# Patient Record
Sex: FEMALE | Race: WHITE | NOT HISPANIC OR LATINO | Employment: STUDENT | URBAN - METROPOLITAN AREA
[De-identification: names, ages, dates, MRNs, and addresses within clinical notes are randomized per-mention and may not be internally consistent; named-entity substitution may affect disease eponyms.]

---
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
## 2020-01-27 ENCOUNTER — TRANSFERRED RECORDS (OUTPATIENT)
Dept: HEALTH INFORMATION MANAGEMENT | Facility: CLINIC | Age: 20
End: 2020-01-27

## 2020-02-27 ENCOUNTER — TRANSFERRED RECORDS (OUTPATIENT)
Dept: HEALTH INFORMATION MANAGEMENT | Facility: CLINIC | Age: 20
End: 2020-02-27

## 2020-05-05 ENCOUNTER — TRANSFERRED RECORDS (OUTPATIENT)
Dept: HEALTH INFORMATION MANAGEMENT | Facility: CLINIC | Age: 20
End: 2020-05-05

## 2020-08-13 ENCOUNTER — TELEPHONE (OUTPATIENT)
Dept: PEDIATRICS | Facility: CLINIC | Age: 20
End: 2020-08-13

## 2020-08-13 NOTE — TELEPHONE ENCOUNTER
I spoke to patient. I told patient current recommendations are to not start having pap smears until age 21 and Dr. Montoya is able to do this when she is that age.    Patient scheduled physical.    SANJUANA ABBASI MA on 8/13/2020 at 2:24 PM

## 2020-08-13 NOTE — TELEPHONE ENCOUNTER
Patient Returning Call  Reason for call:  Medical advise (mother called)  Information relayed to patient:  Nurse will call back  Patient has additional questions:  Yes  What are your questions/concerns:  Patient's mother wants to know whether her daughter should see Dr. Montoya for her physical or go to see an OBGYN for her physical. Mother said she has consent to communicate.  Okay to leave a detailed message?: Yes at Home number on file [phone] (home)

## 2020-12-02 NOTE — PROGRESS NOTES
"Pre-Visit Planning   Next 5 appointments (look out 90 days)    Dec 07, 2020  9:30 AM  (Arrive by 9:05 AM)  Adult Preventative Visit with ZURDO Bishop CNP  [facility] [address] [place] [address]  [address] [place] [id number] [phone]        Appointment Notes for this encounter:   PLEASE COPY INS CARD  physical (rescheduled from 12/4 & 12/9, Jan)    Questionnaires Reviewed/Assigned  No additional questionnaires are needed        Patient preferred phone number: [phone]      Spoke to patient via phone. Patient does not have additional questions or concerns.        Visit is preventive. Reviewed purpose of preventive visit with patient.    Health Maintenance Due   Topic Date Due     ANNUAL REVIEW OF HM ORDERS  2000     HIV SCREENING  09/27/2015     HEPATITIS C SCREENING  09/27/2018     PHQ-2  01/01/2020     PREVENTIVE CARE VISIT  08/16/2020     INFLUENZA VACCINE (1) 09/01/2020     Patient is due for:  N/A    [employer]  Patient is not active on [employer]. Encouraged [employer] activation.    Questionnaire Review   Advised patient to arrive early in order to complete questionnaires.    Call Summary  \"Thank you for your time today.  If anything comes up before your appointment, please feel free to contact us at [phone].\"    "

## 2020-12-07 ENCOUNTER — OFFICE VISIT (OUTPATIENT)
Dept: PEDIATRICS | Facility: CLINIC | Age: 20
End: 2020-12-07
Payer: COMMERCIAL

## 2020-12-07 VITALS
OXYGEN SATURATION: 99 % | RESPIRATION RATE: 16 BRPM | BODY MASS INDEX: 21.24 KG/M2 | DIASTOLIC BLOOD PRESSURE: 62 MMHG | TEMPERATURE: 97.2 F | HEART RATE: 61 BPM | WEIGHT: 118 LBS | SYSTOLIC BLOOD PRESSURE: 110 MMHG

## 2020-12-07 DIAGNOSIS — Z11.3 ROUTINE SCREENING FOR STI (SEXUALLY TRANSMITTED INFECTION): ICD-10-CM

## 2020-12-07 DIAGNOSIS — F41.9 ANXIETY: ICD-10-CM

## 2020-12-07 DIAGNOSIS — Z30.431 CHECKING OF INTRAUTERINE DEVICE: ICD-10-CM

## 2020-12-07 DIAGNOSIS — Z00.00 ROUTINE GENERAL MEDICAL EXAMINATION AT A HEALTH CARE FACILITY: Primary | ICD-10-CM

## 2020-12-07 DIAGNOSIS — R17 ELEVATED BILIRUBIN: ICD-10-CM

## 2020-12-07 PROCEDURE — 96127 BRIEF EMOTIONAL/BEHAV ASSMT: CPT | Performed by: NURSE PRACTITIONER

## 2020-12-07 PROCEDURE — 90686 IIV4 VACC NO PRSV 0.5 ML IM: CPT | Performed by: NURSE PRACTITIONER

## 2020-12-07 PROCEDURE — 90471 IMMUNIZATION ADMIN: CPT | Performed by: NURSE PRACTITIONER

## 2020-12-07 PROCEDURE — 99395 PREV VISIT EST AGE 18-39: CPT | Mod: 25 | Performed by: NURSE PRACTITIONER

## 2020-12-07 RX ORDER — CLINDAMYCIN PHOSPHATE 10 MG/G
GEL TOPICAL DAILY
COMMUNITY

## 2020-12-07 RX ORDER — ESCITALOPRAM OXALATE 10 MG/1
20 TABLET ORAL DAILY
COMMUNITY
End: 2023-07-19

## 2020-12-07 ASSESSMENT — ENCOUNTER SYMPTOMS
HEMATOCHEZIA: 0
NAUSEA: 0
MYALGIAS: 0
DYSURIA: 0
JOINT SWELLING: 0
FEVER: 0
HEARTBURN: 0
ABDOMINAL PAIN: 0
NERVOUS/ANXIOUS: 1
BREAST MASS: 0
ARTHRALGIAS: 0
PALPITATIONS: 0
DIARRHEA: 0
PARESTHESIAS: 0
SHORTNESS OF BREATH: 0
FREQUENCY: 0
HEMATURIA: 0
HEADACHES: 0
SORE THROAT: 0
CONSTIPATION: 0
COUGH: 0
CHILLS: 0
DIZZINESS: 0
WEAKNESS: 0
EYE PAIN: 0

## 2020-12-07 NOTE — PROGRESS NOTES
SUBJECTIVE:   CC: Chelsea Feldman is an 20 year old woman who presents for preventive health visit.     Patient has been advised of split billing requirements and indicates understanding: Yes       Healthy Habits:     Getting at least 3 servings of Calcium per day:  Yes    Bi-annual eye exam:  Yes    Dental care twice a year:  Yes    Sleep apnea or symptoms of sleep apnea:  None    Diet:  Regular (no restrictions)    Frequency of exercise:  2-3 days/week    Duration of exercise:  30-45 minutes    Taking medications regularly:  Yes    Medication side effects:  Not applicable    PHQ-2 Total Score: 0    Additional concerns today:  Yes      IUD x 3.5 years, Mirena. Has been having breakthrough bleeding over the past couple of months. Wondering if her IUD is still effective.     Recently started anxiety medications - escitalopram and propanolol.       Today's PHQ-2 Score:   PHQ-2 ( 1999 Pfizer) 12/7/2020   Q1: Little interest or pleasure in doing things 0   Q2: Feeling down, depressed or hopeless 0   PHQ-2 Score 0   Q1: Little interest or pleasure in doing things Not at all   Q2: Feeling down, depressed or hopeless Not at all   PHQ-2 Score 0       Abuse: Current or Past (Physical, Sexual or Emotional) - No  Do you feel safe in your environment? Yes      Social History     Tobacco Use     Smoking status: Never Smoker     Smokeless tobacco: Never Used   Substance Use Topics     Alcohol use: No     Alcohol/week: 0.0 standard drinks     Frequency: 2-4 times a month     Drinks per session: 1 or 2     Binge frequency: Never       Alcohol Use 12/7/2020   Prescreen: >3 drinks/day or >7 drinks/week? No       Reviewed orders with patient.  Reviewed health maintenance and updated orders accordingly - Yes     Mammogram not appropriate for this patient based on age. Pertinent mammograms are reviewed under the imaging tab.    History of abnormal Pap smear: NO - under age 21, PAP not appropriate for age     Reviewed and updated  as needed this visit by clinical staff   Allergies               Reviewed and updated as needed this visit by Provider                Past Medical History:   Diagnosis Date     Allergic rhinitis, cause unspecified     Allergic rhinitis     Contact dermatitis and other eczema, due to unspecified cause      Past Surgical History:   Procedure Laterality Date     ARTHROSCOPIC RECONSTRUCTION ANTERIOR CRUCIATE LIGAMENT Right 10/7/2016    Procedure: ARTHROSCOPIC RECONSTRUCTION ANTERIOR CRUCIATE LIGAMENT;  Surgeon: Dheeraj Zepeda MD;  Location: [facility] OR     Family History   Problem Relation Age of Onset     No Known Problems Mother      Cancer - colorectal Father 50        in remission     Asthma Maternal Aunt         possibly weight induced     Hypertension Maternal Aunt         weight induced     Diabetes Maternal Aunt         type 2-possibly weight induced     C.A.D. No family hx of      Cerebrovascular Disease No family hx of      Breast Cancer No family hx of      Social History     Socioeconomic History     Marital status: Single     Spouse name: Not on file     Number of children: Not on file     Years of education: Not on file     Highest education level: 12th grade   Occupational History     Not on file   Social Needs     Financial resource strain: Not hard at all     Food insecurity     Worry: Never true     Inability: Never true     Transportation needs     Medical: No     Non-medical: No   Tobacco Use     Smoking status: Never Smoker     Smokeless tobacco: Never Used   Substance and Sexual Activity     Alcohol use: No     Alcohol/week: 0.0 standard drinks     Frequency: 2-4 times a month     Drinks per session: 1 or 2     Binge frequency: Never     Drug use: No     Sexual activity: Yes     Partners: Male     Birth control/protection: I.U.D.   Lifestyle     Physical activity     Days per week: 3 days     Minutes per session: 40 min     Stress: Only a little   Relationships     Social connections     Talks  on phone: More than three times a week     Gets together: More than three times a week     Attends [employer] service: 1 to 4 times per year     Active member of club or organization: No     Attends meetings of clubs or organizations: Never     Relationship status: Never      Intimate partner violence     Fear of current or ex partner: Not on file     Emotionally abused: Not on file     Physically abused: Not on file     Forced sexual activity: Not on file   Other Topics Concern     Not on file   Social History Narrative    Full time student at [employer], Sophomore, studying business. Also works part time at [employer].             Eliz Love, DNP, APRN, CNP     Current Outpatient Medications   Medication     clindamycin (CLINDAMAX) 1 % external gel     escitalopram (LEXAPRO) 10 MG tablet     PROPRANOLOL HCL PO     levonorgestrel (MIRENA) 20 MCG/24HR IUD     No current facility-administered medications for this visit.         Allergies   Allergen Reactions     No Known Drug Allergies        Review of Systems   Constitutional: Negative for chills and fever.   HENT: Negative for congestion, ear pain, hearing loss and sore throat.    Eyes: Negative for pain and visual disturbance.   Respiratory: Negative for cough and shortness of breath.    Cardiovascular: Negative for chest pain, palpitations and peripheral edema.   Gastrointestinal: Negative for abdominal pain, constipation, diarrhea, heartburn, hematochezia and nausea.   Breasts:  Negative for tenderness, breast mass and discharge.   Genitourinary: Negative for dysuria, frequency, genital sores, hematuria, pelvic pain, urgency, vaginal bleeding and vaginal discharge.   Musculoskeletal: Negative for arthralgias, joint swelling and myalgias.   Skin: Negative for rash.   Neurological: Negative for dizziness, weakness, headaches and paresthesias.   Psychiatric/Behavioral: Negative for mood changes. The patient is nervous/anxious.        OBJECTIVE:   BP  110/62   Pulse 61   Temp 97.2  F (36.2  C) (Tympanic)   Resp 16   Wt 53.5 kg (118 lb)   SpO2 99%   BMI 21.24 kg/m    Physical Exam  Constitutional: appears to be in no acute distress, comfortable, pleasant.   Eyes: anicteric, conjunctiva clear without drainage or erythema. ANGELO.   Ears, Nose and Throat: tympanic membranes gray with LR,  nose without nasal discharge. OP: no erythema to posterior pharynx, negative post nasal drainage, tonsils +1 no erythema or exudate.  Neck: supple, thyroid palpable,not enlarged, no nodules   Breast: Deferred exam.  Cardiovascular: regular rate and rhythm, normal S1 and S2, no murmurs, rubs or gallops, peripheral pulses full and symmetric; negative peripheral edema   Respiratory: Air entry throughout. Breathing pattern unlabored without the use of accessory muscles. Clear to auscultation A and P, no wheezes or crackles, normal breath sounds.    Gastrointestinal: rounded, soft. Positive bowel sounds x4, nontender, no masses.   Genitourinary: external genitalia is without lesions. Introitus is normal, vaginal walls pink and moist without lesions or evidence of trauma. There is no abnormal discharge from the cervix. IUD strings visualized. Cervix is pink without polyps or lesions.  Musculoskeletal: full range of motion, no edema.   Skin: pink, turgor smooth and elastic. Negative for lesions or dryness.  Neurological: normal gait, no tremor.   Psychological: appropriate mood and affect.   Lymphatic: no cervical, axillary, supraclavicular, or infraclavicular lymphadenopathy.    Diagnostic Test Results:  Labs reviewed in Epic    ASSESSMENT/PLAN:   1. Routine general medical examination at a health care facility  Age appropriate screening and preventative care have been addressed today. Vaccinations have been updated. Recommend annual vision exams as well as biannual dental exams. They will follow up for annual physical again in one year.   Plan:   - INFLUENZA VACCINE IM > 6 MONTHS  "VALENT IIV4 [54578]   - INITIAL VACCINE ADMINSTRATION   - Lipid panel reflex to direct LDL Fasting; Future   - Glucose; Future    2. Routine screening for STI (sexually transmitted infection)  Denies current s/sx of STI.   Plan:   - Treponema Abs w Reflex to RPR and Titer; Future   - **Hepatitis C Screen Reflex to RNA FUTURE anytime; Future   - HIV Antigen Antibody Combo; Future   - Chlamydia trachomatis PCR; Future   - Neisseria gonorrhoeae PCR; Future    3. Anxiety  Continue escitalopram and propanolol as needed.     4. Elevated bilirubin  History of elevated bilirubin. Recheck hepatic panel today.  Plan:   - Hepatic panel (Albumin, ALT, AST, Bili, Alk Phos, TP); Future    5. Checking of intrauterine device  IUD x 3.5 years, Mirena. Has been having breakthrough bleeding over the past couple of months. IUD strings visualized on exam today, reassurance offered.        Follow-up: Lab results pending, will follow-up as indicated after reviewing results.       COUNSELING:  Reviewed preventive health counseling, as reflected in patient instructions  Special attention given to:        Regular exercise       Healthy diet/nutrition       Immunizations    Vaccinated for: Influenza         Contraception       Safe sex practices/STD prevention       HIV screeninx in teen years, 1x in adult years, and at intervals if high risk    Estimated body mass index is 21.24 kg/m  as calculated from the following:    Height as of 19: 1.588 m (5' 2.5\").    Weight as of this encounter: 53.5 kg (118 lb).      She reports that she has never smoked. She has never used smokeless tobacco.      Counseling Resources:  ATP IV Guidelines  Pooled Cohorts Equation Calculator  Breast Cancer Risk Calculator  BRCA-Related Cancer Risk Assessment: FHS-7 Tool  FRAX Risk Assessment  ICSI Preventive Guidelines  Dietary Guidelines for Americans, 2010  USDA's MyPlate  ASA Prophylaxis  Lung CA Screening    Eliz Love, ZURDO CNP  [facility] " [facility]

## 2020-12-23 DIAGNOSIS — Z00.00 ROUTINE GENERAL MEDICAL EXAMINATION AT A HEALTH CARE FACILITY: ICD-10-CM

## 2020-12-23 DIAGNOSIS — Z11.3 ROUTINE SCREENING FOR STI (SEXUALLY TRANSMITTED INFECTION): ICD-10-CM

## 2020-12-23 DIAGNOSIS — R17 ELEVATED BILIRUBIN: ICD-10-CM

## 2020-12-23 LAB
HCV AB SERPL QL IA: NONREACTIVE
HIV 1+2 AB+HIV1 P24 AG SERPL QL IA: NONREACTIVE
T PALLIDUM AB SER QL: NONREACTIVE

## 2020-12-23 PROCEDURE — 87389 HIV-1 AG W/HIV-1&-2 AB AG IA: CPT | Performed by: NURSE PRACTITIONER

## 2020-12-23 PROCEDURE — 99000 SPECIMEN HANDLING OFFICE-LAB: CPT | Performed by: NURSE PRACTITIONER

## 2020-12-23 PROCEDURE — 86803 HEPATITIS C AB TEST: CPT | Performed by: NURSE PRACTITIONER

## 2020-12-23 PROCEDURE — 82947 ASSAY GLUCOSE BLOOD QUANT: CPT | Performed by: NURSE PRACTITIONER

## 2020-12-23 PROCEDURE — 86780 TREPONEMA PALLIDUM: CPT | Mod: 90 | Performed by: NURSE PRACTITIONER

## 2020-12-23 PROCEDURE — 80076 HEPATIC FUNCTION PANEL: CPT | Performed by: NURSE PRACTITIONER

## 2020-12-23 PROCEDURE — 36415 COLL VENOUS BLD VENIPUNCTURE: CPT | Performed by: NURSE PRACTITIONER

## 2020-12-23 PROCEDURE — 80061 LIPID PANEL: CPT | Performed by: NURSE PRACTITIONER

## 2020-12-24 LAB
ALBUMIN SERPL-MCNC: 3.8 G/DL (ref 3.4–5)
ALP SERPL-CCNC: 52 U/L (ref 40–150)
ALT SERPL W P-5'-P-CCNC: 19 U/L (ref 0–50)
AST SERPL W P-5'-P-CCNC: 13 U/L (ref 0–45)
BILIRUB DIRECT SERPL-MCNC: 0.3 MG/DL (ref 0–0.2)
BILIRUB SERPL-MCNC: 1 MG/DL (ref 0.2–1.3)
CHOLEST SERPL-MCNC: 109 MG/DL
GLUCOSE SERPL-MCNC: 80 MG/DL (ref 70–99)
HDLC SERPL-MCNC: 59 MG/DL
LDLC SERPL CALC-MCNC: 40 MG/DL
NONHDLC SERPL-MCNC: 50 MG/DL
PROT SERPL-MCNC: 6.8 G/DL (ref 6.8–8.8)
TRIGL SERPL-MCNC: 51 MG/DL

## 2021-02-15 ENCOUNTER — OFFICE VISIT (OUTPATIENT)
Dept: OBGYN | Facility: CLINIC | Age: 21
End: 2021-02-15
Payer: COMMERCIAL

## 2021-02-15 VITALS — DIASTOLIC BLOOD PRESSURE: 60 MMHG | SYSTOLIC BLOOD PRESSURE: 110 MMHG | WEIGHT: 119.9 LBS | BODY MASS INDEX: 21.58 KG/M2

## 2021-02-15 DIAGNOSIS — Z30.430 ENCOUNTER FOR IUD INSERTION: ICD-10-CM

## 2021-02-15 DIAGNOSIS — Z30.432 ENCOUNTER FOR IUD REMOVAL: Primary | ICD-10-CM

## 2021-02-15 PROCEDURE — 58300 INSERT INTRAUTERINE DEVICE: CPT | Performed by: OBSTETRICS & GYNECOLOGY

## 2021-02-15 PROCEDURE — 58301 REMOVE INTRAUTERINE DEVICE: CPT | Performed by: OBSTETRICS & GYNECOLOGY

## 2021-02-15 ASSESSMENT — ANXIETY QUESTIONNAIRES
6. BECOMING EASILY ANNOYED OR IRRITABLE: NOT AT ALL
2. NOT BEING ABLE TO STOP OR CONTROL WORRYING: MORE THAN HALF THE DAYS
7. FEELING AFRAID AS IF SOMETHING AWFUL MIGHT HAPPEN: NOT AT ALL
1. FEELING NERVOUS, ANXIOUS, OR ON EDGE: MORE THAN HALF THE DAYS
GAD7 TOTAL SCORE: 7
5. BEING SO RESTLESS THAT IT IS HARD TO SIT STILL: SEVERAL DAYS
3. WORRYING TOO MUCH ABOUT DIFFERENT THINGS: SEVERAL DAYS

## 2021-02-15 ASSESSMENT — PATIENT HEALTH QUESTIONNAIRE - PHQ9: 5. POOR APPETITE OR OVEREATING: SEVERAL DAYS

## 2021-02-15 NOTE — NURSING NOTE
"Chief Complaint   Patient presents with     Consult     for IUD replacement. Patient had Mirena IUD inserted 2018. Recently in the past 4 months she started getting cramps, mood swings, pre-period discharge, and getting her period again. Bleeding can last anywhere from 3 days to a week. Patient is having  itching and odor before her periods.        Initial /60   Wt 54.4 kg (119 lb 14.4 oz)   LMP 2021   BMI 21.58 kg/m   Estimated body mass index is 21.58 kg/m  as calculated from the following:    Height as of 19: 1.588 m (5' 2.5\").    Weight as of this encounter: 54.4 kg (119 lb 14.4 oz).  BP completed using cuff size: regular    Questioned patient about current smoking habits.  Pt. has never smoked.          The following HM Due: NONE      Johanne Hager CMA             "

## 2021-02-15 NOTE — PROGRESS NOTES
INDICATIONS:                                                      Chelsea Feldman is a 20 year old female,, Patient's last menstrual period was 2021. She presents today for mirena  IUD removal and reinsertion. Her current IUD was placed 3 years ago for management of dysmenorrhea and HUB. She had good success for 3 years, but has now had return of cramping, PMS symptoms, menses, and discharge prior to menses in the last 5 months. She would like to try replacing the IUD. She has not had problems with the IUD. We discussed option for adding an combined oral contraceptives or progesterone to maximize effects of IUD vs replacing the IUD today, which she desires. The risks, benefits and alternatives of IUD insertion were discussed in detail previously.  She has elected to go ahead with the insertion  today and her questions were answered. Consent was signed. A pregnancy test was not performed today due to continued use of effective contraception.    Past Medical History:   Diagnosis Date     Allergic rhinitis, cause unspecified     Allergic rhinitis     Anxiety      Contact dermatitis and other eczema, due to unspecified cause       Past Surgical History:   Procedure Laterality Date     ARTHROSCOPIC RECONSTRUCTION ANTERIOR CRUCIATE LIGAMENT Right 10/7/2016    Procedure: ARTHROSCOPIC RECONSTRUCTION ANTERIOR CRUCIATE LIGAMENT;  Surgeon: Dheeraj Zepeda MD;  Location: [facility] OR        ROS:  Const: no weight change, fatigue  : + dysmenorrhea, menorrhagia, no intermenstrual bleeding    PROCEDURE:                                                    /60   Wt 54.4 kg (119 lb 14.4 oz)   LMP 2021   BMI 21.58 kg/m     The pelvic exam revealed normal external genitalia. On bimanual exam the uterus was Anteverted and normal in size with no tenderness present. A speculum was inserted into the vagina and the cervix was visualized. The strings were clearly visible at the external cervical os and  they were grasped with a ring forceps and the IUD was removed intact. The cervix was then prepped with Betadine. The anterior lip of the cervix was grasped with a single toothed tenaculum. The uterus sounded to 7 cm. A Mirena IUD was then inserted without difficulty. The string was cut to 3 cm.  The patient experienced a moderate amount of cramping.    POST PROCEDURE:                                                    Chelsea Feldman is a 20 year old female here for removal and insertion of IUD.  She  tolerated the procedure well. There were no complications. Patient was discharged in stable condition.    Return to clinic in 4-5 weeks for IUD check.  No need for back up contraception given continuous IUD use. Call if severe cramping, fever, abnormal bleeding, abnormal discharge or pelvic pain develop.  Patient was counseled about the chance of irregular bleeding.    Puja Castaneda MD

## 2021-02-16 ASSESSMENT — ANXIETY QUESTIONNAIRES: GAD7 TOTAL SCORE: 7

## 2021-11-18 ENCOUNTER — TRANSFERRED RECORDS (OUTPATIENT)
Dept: HEALTH INFORMATION MANAGEMENT | Facility: CLINIC | Age: 21
End: 2021-11-18
Payer: COMMERCIAL

## 2021-11-23 NOTE — PATIENT INSTRUCTIONS
Preventive Health Recommendations  Female Ages 18 to 20     Yearly exam:     See your health care provider every year in order to  o Review health changes.   o Discuss preventive care.    o Review your medicines if your doctor has prescribed any.      You should be tested each year for STDs (sexually transmitted diseases).       After age 20, talk to your provider about how often you should have cholesterol testing.      If you are at risk for diabetes, you should have a diabetes test (fasting glucose).     Shots:     Get a flu shot each year.     Get a tetanus shot every 10 years.     Consider getting the shot (vaccine) that prevents cervical cancer (Gardasil).    Nutrition:     Eat at least 5 servings of fruits and vegetables each day.    Eat whole-grain bread, whole-wheat pasta and brown rice instead of white grains and rice.    Get adequate Calcium and Vitamin D.     Lifestyle    Exercise at least 150 minutes a week each week (30 minutes a day, 5 days a week). This will help you control your weight and prevent disease.    No smoking.     Wear sunscreen to prevent skin cancer.    See your dentist every six months for an exam and cleaning.   11/23/2021 11:30 AM    Ms. Sutton Davis  [address] [facility] [id number]      Mihir Chaves is under the care of Gabi Mendenhall for her left elbow. She will need an Ulnar Palsy Dynamic splint to prevent contracture.         Sincerely,              Obey Hays MD

## 2022-10-17 ENCOUNTER — TRANSFERRED RECORDS (OUTPATIENT)
Dept: HEALTH INFORMATION MANAGEMENT | Facility: CLINIC | Age: 22
End: 2022-10-17

## 2023-05-31 ENCOUNTER — TRANSFERRED RECORDS (OUTPATIENT)
Dept: HEALTH INFORMATION MANAGEMENT | Facility: CLINIC | Age: 23
End: 2023-05-31

## 2023-06-01 ENCOUNTER — TRANSFERRED RECORDS (OUTPATIENT)
Dept: MULTI SPECIALTY CLINIC | Facility: CLINIC | Age: 23
End: 2023-06-01

## 2023-06-01 LAB — PAP SMEAR - HIM PATIENT REPORTED: NEGATIVE

## 2023-06-26 NOTE — TELEPHONE ENCOUNTER
"Name: Shira Pastrana      : 1992      MRN: [id number]  Encounter Provider: Amadou Chin MD  Encounter Date: 2023   Encounter department: [place] [address] [address] [place] [address]     1  Complex regional pain syndrome type 1 of right lower extremity  Assessment & Plan:  Follow-up with pain management      2  Paramyotonia congenita  Assessment & Plan:  Follow-up with neurology      3  Intervertebral disc disorder with radiculopathy of lumbosacral region    4  Anxiety  -     ALPRAZolam (XANAX) 2 MG tablet; TAKE 2 TABLETS TWICE A DAY AND 1 TABLET AT BEDTIME         Subjective      Patient comes in for checkup  He continues to take Xanax 4 mg twice a day and 2 mg at bedtime for his chronic anxiety and insomnia associated with his complex regional pain syndrome of his right lower extremity  He has nerve stimulator which was recently adjusted to decrease his pain and symptoms are doing better along these lines  Review of Systems   Constitutional: Negative  Respiratory: Negative  Musculoskeletal: Positive for gait problem  Psychiatric/Behavioral: Positive for sleep disturbance  The patient is nervous/anxious  Current Outpatient Medications on File Prior to Visit   Medication Sig   • [DISCONTINUED] ALPRAZolam (XANAX) 2 MG tablet TAKE 2 TABLETS TWICE A DAY AND 1 TABLET AT BEDTIME   • [DISCONTINUED] predniSONE 10 mg tablet Six tablets day 1; 5 tablets day 2; 4 tablets day 3; 3 tablets day 4; 2 tablets day 5; and 1 tablet day 6 (Patient not taking: Reported on 2023)       Objective     /82 (BP Location: Left arm, Patient Position: Sitting, Cuff Size: Standard)   Pulse 68   Temp 98 2 °F (36 8 °C) (Tympanic)   Ht 6' 1\" (1 854 m)   Wt 82 7 kg (182 lb 6 4 oz)   SpO2 98%   BMI 24 06 kg/m²     Physical Exam  Constitutional:       Appearance: Normal appearance  He is well-developed  HENT:      Head: Normocephalic and atraumatic        Left " Discussed elevated bili with Dr. Monzon - sherif Olvera.  AST/ALT normal - ok to start accutane.  Derm to follow AST/ALT - I will follow up bili next summer 2019 as previously recommended.    Smiley Montoya MD  Internal Medicine/Pediatrics  [facility]       Ear: Tympanic membrane normal    Eyes:      Pupils: Pupils are equal, round, and reactive to light  Cardiovascular:      Rate and Rhythm: Normal rate and regular rhythm  Heart sounds: Normal heart sounds  Pulmonary:      Effort: Pulmonary effort is normal       Breath sounds: Normal breath sounds  Abdominal:      General: Bowel sounds are normal       Palpations: Abdomen is soft  There is no mass  Tenderness: There is no abdominal tenderness  Musculoskeletal:         General: Normal range of motion  Cervical back: Neck supple  Skin:     General: Skin is warm and dry  Neurological:      Mental Status: He is alert  Psychiatric:         Mood and Affect: Mood normal          Behavior: Behavior normal          Thought Content:  Thought content normal        Vivian Valero MD

## 2023-07-13 ENCOUNTER — NURSE TRIAGE (OUTPATIENT)
Dept: NURSING | Facility: CLINIC | Age: 23
End: 2023-07-13
Payer: COMMERCIAL

## 2023-07-14 NOTE — TELEPHONE ENCOUNTER
"    Nurse Triage SBAR    Is this a 2nd Level Triage? YES, LICENSED PRACTITIONER REVIEW IS REQUIRED    Situation: Patient is having constant abdominal pain    Background: Patient is 22 years old.  She states she started to have abdominal pain in the region of her belly button starting at 10am today.  At noon it got much worse.  Patient took some tylenol and tums and got no relief.    Assessment: Patient states she feels sweaty and weak.  She has constant abdominal pain 5/10 and sharp pains that come and go.  She doesn't feel like any position helps.    Patient has no thermometer but feels warm.  Patient had a bowel movement but no blood or black.  Patient is drinking fluids.  Some nausea no vomiting    Protocol Recommended Disposition:   No disposition on file.    Recommendation: What do you recommend? Can try pepto bismol, but should go in to be seen in ED.    Paged to provider Dr Mckoy who is backup for [facility]    Does the patient meet one of the following criteria for ADS visit consideration? 16+ years old, with an [facility] PCP         Provider Recommendation Follow Up:   Reached patient/caregiver. Informed of provider's recommendations. Patient verbalized understanding and agrees with the plan.       Reason for Disposition   [1] MILD-MODERATE pain AND [2] constant AND [3] present > 2 hours    Additional Information   Negative: Shock suspected (e.g., cold/pale/clammy skin, too weak to stand, low BP, rapid pulse)   Negative: Difficult to awaken or acting confused (e.g., disoriented, slurred speech)   Negative: Passed out (i.e., lost consciousness, collapsed and was not responding)   Negative: Sounds like a life-threatening emergency to the triager   Negative: Chest pain   Negative: Pain is mainly in upper abdomen  (if needed ask: \"is it mainly above the belly button?\")   Negative: Followed an abdomen (stomach) injury   Negative: [1] Abdominal pain AND [2] pregnant < 20 weeks   Negative: [1] Abdominal pain AND [2] " "pregnant 20 or more weeks   Negative: [1] Abdominal pain AND [2] postpartum (from 0 to 6 weeks after delivery)   Negative: [1] SEVERE pain (e.g., excruciating) AND [2] present > 1 hour   Negative: [1] SEVERE pain AND [2] age > 60 years   Negative: [1] Vomiting AND [2] contains red blood or black (\"coffee ground\") material  (Exception: few red streaks in vomit that only happened once)   Negative: Blood in bowel movements (Exception: blood on surface of BM with constipation)   Negative: Black or tarry bowel movements (Exception: chronic-unchanged black-grey bowel movements AND is taking iron pills or Pepto-bismol)   Negative: Patient sounds very sick or weak to the triager    Protocols used: Abdominal Pain - Female-A-AH    "

## 2023-07-19 ENCOUNTER — OFFICE VISIT (OUTPATIENT)
Dept: FAMILY MEDICINE | Facility: CLINIC | Age: 23
End: 2023-07-19
Payer: COMMERCIAL

## 2023-07-19 ENCOUNTER — TELEPHONE (OUTPATIENT)
Dept: GASTROENTEROLOGY | Facility: CLINIC | Age: 23
End: 2023-07-19

## 2023-07-19 VITALS
RESPIRATION RATE: 18 BRPM | TEMPERATURE: 98.5 F | HEIGHT: 63 IN | BODY MASS INDEX: 21.95 KG/M2 | WEIGHT: 123.9 LBS | OXYGEN SATURATION: 98 % | DIASTOLIC BLOOD PRESSURE: 72 MMHG | SYSTOLIC BLOOD PRESSURE: 118 MMHG | HEART RATE: 78 BPM

## 2023-07-19 DIAGNOSIS — R10.2 PELVIC PAIN IN FEMALE: ICD-10-CM

## 2023-07-19 DIAGNOSIS — R10.84 ABDOMINAL PAIN, GENERALIZED: ICD-10-CM

## 2023-07-19 DIAGNOSIS — N76.0 RECURRENT VAGINITIS: ICD-10-CM

## 2023-07-19 DIAGNOSIS — F32.9 MAJOR DEPRESSIVE DISORDER WITH CURRENT ACTIVE EPISODE, UNSPECIFIED DEPRESSION EPISODE SEVERITY, UNSPECIFIED WHETHER RECURRENT: ICD-10-CM

## 2023-07-19 DIAGNOSIS — Z00.00 ROUTINE ADULT HEALTH MAINTENANCE: Primary | ICD-10-CM

## 2023-07-19 DIAGNOSIS — N92.0 MENORRHAGIA WITH REGULAR CYCLE: ICD-10-CM

## 2023-07-19 DIAGNOSIS — F41.1 GAD (GENERALIZED ANXIETY DISORDER): ICD-10-CM

## 2023-07-19 LAB
ANION GAP SERPL CALCULATED.3IONS-SCNC: 11 MMOL/L (ref 7–15)
BUN SERPL-MCNC: 12.4 MG/DL (ref 6–20)
CALCIUM SERPL-MCNC: 9.6 MG/DL (ref 8.6–10)
CHLORIDE SERPL-SCNC: 103 MMOL/L (ref 98–107)
CLUE CELLS: ABNORMAL
CREAT SERPL-MCNC: 0.69 MG/DL (ref 0.51–0.95)
DEPRECATED HCO3 PLAS-SCNC: 25 MMOL/L (ref 22–29)
ERYTHROCYTE [DISTWIDTH] IN BLOOD BY AUTOMATED COUNT: 11 % (ref 10–15)
FERRITIN SERPL-MCNC: 93 NG/ML (ref 6–175)
GFR SERPL CREATININE-BSD FRML MDRD: >90 ML/MIN/1.73M2
GLUCOSE SERPL-MCNC: 86 MG/DL (ref 70–99)
HCT VFR BLD AUTO: 37 % (ref 35–47)
HGB BLD-MCNC: 13.4 G/DL (ref 11.7–15.7)
IRON BINDING CAPACITY (ROCHE): 279 UG/DL (ref 240–430)
IRON SATN MFR SERPL: 38 % (ref 15–46)
IRON SERPL-MCNC: 107 UG/DL (ref 37–145)
MCH RBC QN AUTO: 31.6 PG (ref 26.5–33)
MCHC RBC AUTO-ENTMCNC: 36.2 G/DL (ref 31.5–36.5)
MCV RBC AUTO: 87 FL (ref 78–100)
PLATELET # BLD AUTO: 255 10E3/UL (ref 150–450)
POTASSIUM SERPL-SCNC: 3.8 MMOL/L (ref 3.4–5.3)
RBC # BLD AUTO: 4.24 10E6/UL (ref 3.8–5.2)
SODIUM SERPL-SCNC: 139 MMOL/L (ref 136–145)
TRICHOMONAS, WET PREP: ABNORMAL
TSH SERPL DL<=0.005 MIU/L-ACNC: 1.39 UIU/ML (ref 0.3–4.2)
VIT B12 SERPL-MCNC: 798 PG/ML (ref 232–1245)
WBC # BLD AUTO: 6.2 10E3/UL (ref 4–11)
WBC'S/HIGH POWER FIELD, WET PREP: ABNORMAL
YEAST, WET PREP: ABNORMAL

## 2023-07-19 PROCEDURE — 83540 ASSAY OF IRON: CPT | Performed by: PHYSICIAN ASSISTANT

## 2023-07-19 PROCEDURE — 85027 COMPLETE CBC AUTOMATED: CPT | Performed by: PHYSICIAN ASSISTANT

## 2023-07-19 PROCEDURE — 99214 OFFICE O/P EST MOD 30 MIN: CPT | Mod: 25 | Performed by: PHYSICIAN ASSISTANT

## 2023-07-19 PROCEDURE — 82607 VITAMIN B-12: CPT | Performed by: PHYSICIAN ASSISTANT

## 2023-07-19 PROCEDURE — 80048 BASIC METABOLIC PNL TOTAL CA: CPT | Performed by: PHYSICIAN ASSISTANT

## 2023-07-19 PROCEDURE — 84443 ASSAY THYROID STIM HORMONE: CPT | Performed by: PHYSICIAN ASSISTANT

## 2023-07-19 PROCEDURE — 99395 PREV VISIT EST AGE 18-39: CPT | Performed by: PHYSICIAN ASSISTANT

## 2023-07-19 PROCEDURE — 82306 VITAMIN D 25 HYDROXY: CPT | Performed by: PHYSICIAN ASSISTANT

## 2023-07-19 PROCEDURE — 82728 ASSAY OF FERRITIN: CPT | Performed by: PHYSICIAN ASSISTANT

## 2023-07-19 PROCEDURE — 87210 SMEAR WET MOUNT SALINE/INK: CPT | Performed by: PHYSICIAN ASSISTANT

## 2023-07-19 PROCEDURE — 36415 COLL VENOUS BLD VENIPUNCTURE: CPT | Performed by: PHYSICIAN ASSISTANT

## 2023-07-19 PROCEDURE — 83550 IRON BINDING TEST: CPT | Performed by: PHYSICIAN ASSISTANT

## 2023-07-19 RX ORDER — METHYLPHENIDATE HYDROCHLORIDE 10 MG/1
10 TABLET ORAL DAILY
COMMUNITY
Start: 2023-07-05

## 2023-07-19 RX ORDER — GUANFACINE 1 MG/1
1 TABLET, EXTENDED RELEASE ORAL DAILY
COMMUNITY
Start: 2023-07-17

## 2023-07-19 RX ORDER — HYDROXYZINE HYDROCHLORIDE 50 MG/1
50 TABLET, FILM COATED ORAL DAILY
COMMUNITY
Start: 2023-06-12

## 2023-07-19 RX ORDER — BUSPIRONE HYDROCHLORIDE 10 MG/1
10 TABLET ORAL DAILY
COMMUNITY
Start: 2023-07-17

## 2023-07-19 RX ORDER — BUPROPION HYDROCHLORIDE 150 MG/1
150 TABLET ORAL DAILY
COMMUNITY
Start: 2023-07-06

## 2023-07-19 RX ORDER — SIMETHICONE 80 MG
80 TABLET,CHEWABLE ORAL PRN
COMMUNITY

## 2023-07-19 ASSESSMENT — ANXIETY QUESTIONNAIRES
GAD7 TOTAL SCORE: 14
4. TROUBLE RELAXING: NEARLY EVERY DAY
2. NOT BEING ABLE TO STOP OR CONTROL WORRYING: MORE THAN HALF THE DAYS
1. FEELING NERVOUS, ANXIOUS, OR ON EDGE: MORE THAN HALF THE DAYS
6. BECOMING EASILY ANNOYED OR IRRITABLE: MORE THAN HALF THE DAYS
IF YOU CHECKED OFF ANY PROBLEMS ON THIS QUESTIONNAIRE, HOW DIFFICULT HAVE THESE PROBLEMS MADE IT FOR YOU TO DO YOUR WORK, TAKE CARE OF THINGS AT HOME, OR GET ALONG WITH OTHER PEOPLE: VERY DIFFICULT
7. FEELING AFRAID AS IF SOMETHING AWFUL MIGHT HAPPEN: SEVERAL DAYS
3. WORRYING TOO MUCH ABOUT DIFFERENT THINGS: MORE THAN HALF THE DAYS
5. BEING SO RESTLESS THAT IT IS HARD TO SIT STILL: MORE THAN HALF THE DAYS
GAD7 TOTAL SCORE: 14

## 2023-07-19 ASSESSMENT — ENCOUNTER SYMPTOMS
SHORTNESS OF BREATH: 0
FREQUENCY: 0
HEADACHES: 1
MYALGIAS: 0
HEMATURIA: 0
SORE THROAT: 0
PARESTHESIAS: 0
JOINT SWELLING: 0
ABDOMINAL PAIN: 1
HEMATOCHEZIA: 0
DYSURIA: 0
NAUSEA: 1
NERVOUS/ANXIOUS: 1
BREAST MASS: 0
COUGH: 0
CHILLS: 0
EYE PAIN: 0
ARTHRALGIAS: 1
DIARRHEA: 0
HEARTBURN: 1
DIZZINESS: 0
PALPITATIONS: 0
FEVER: 0
WEAKNESS: 0
CONSTIPATION: 1

## 2023-07-19 ASSESSMENT — PATIENT HEALTH QUESTIONNAIRE - PHQ9
SUM OF ALL RESPONSES TO PHQ QUESTIONS 1-9: 10
10. IF YOU CHECKED OFF ANY PROBLEMS, HOW DIFFICULT HAVE THESE PROBLEMS MADE IT FOR YOU TO DO YOUR WORK, TAKE CARE OF THINGS AT HOME, OR GET ALONG WITH OTHER PEOPLE: SOMEWHAT DIFFICULT
SUM OF ALL RESPONSES TO PHQ QUESTIONS 1-9: 10

## 2023-07-19 ASSESSMENT — PAIN SCALES - GENERAL: PAINLEVEL: MILD PAIN (2)

## 2023-07-19 NOTE — PROGRESS NOTES
SUBJECTIVE:   CC: Lary is an 22 year old who presents for preventive health visit.       7/19/2023     2:39 PM   Additional Questions   Roomed by Sirena   Accompanied by Self     Healthy Habits:     Getting at least 3 servings of Calcium per day:  Yes    Bi-annual eye exam:  Yes    Dental care twice a year:  Yes    Sleep apnea or symptoms of sleep apnea:  Daytime drowsiness    Diet:  Other    Frequency of exercise:  2-3 days/week    Duration of exercise:  30-45 minutes    Taking medications regularly:  Yes    Medication side effects:  Other    Additional concerns today:  Yes    Lary here today for RHM vsiit    #ABD pain:  In ED on 7/13/23, had negative CT and abd US  Pain has been on and off  Would like GI referral - tried to schedule after ED visit but they said she needed referral    #Mental health  Follows with [facility] in [place] for psychiatry, has recommended labs    #Menses:  Gets ovulation pain  Last period was irregular  Cramping has been getting worse  Has mirena IUD - prior to mirena had really heavy, crampy periods - last period reminded her of her periods before getting the IUD  Wonders about other management she can do for AUB    Yellow discharge, no odor  Has had recurrent BV, last treated in early June with metrogel    Today's PHQ-9 Score:       7/19/2023     2:19 PM   PHQ-9 SCORE   PHQ-9 Total Score MyChart 10 (Moderate depression)   PHQ-9 Total Score 10     Social History     Tobacco Use     Smoking status: Never     Smokeless tobacco: Never   Substance Use Topics     Alcohol use: No     Alcohol/week: 0.0 standard drinks of alcohol           7/19/2023     2:22 PM   Alcohol Use   Prescreen: >3 drinks/day or >7 drinks/week? No     Reviewed orders with patient.  Reviewed health maintenance and updated orders accordingly - Yes    Breast Cancer Screening:    History of abnormal Pap smear: NO - age 21-29 PAP every 3 years recommended     Reviewed and updated as needed this visit by clinical staff   Tobacco  " Allergies  Meds  Problems  Med Hx  Surg Hx  Fam Hx          Reviewed and updated as needed this visit by Provider   Tobacco  Allergies  Meds  Problems  Med Hx  Surg Hx  Fam Hx         Review of Systems   Constitutional: Negative for chills and fever.   HENT: Negative for congestion, ear pain, hearing loss and sore throat.    Eyes: Negative for pain and visual disturbance.   Respiratory: Negative for cough and shortness of breath.    Cardiovascular: Negative for chest pain, palpitations and peripheral edema.   Gastrointestinal: Positive for abdominal pain, constipation, heartburn and nausea. Negative for diarrhea and hematochezia.   Breasts:  Negative for tenderness, breast mass and discharge.   Genitourinary: Positive for vaginal discharge. Negative for dysuria, frequency, genital sores, hematuria, pelvic pain, urgency and vaginal bleeding.   Musculoskeletal: Positive for arthralgias. Negative for joint swelling and myalgias.   Skin: Negative for rash.   Neurological: Positive for headaches. Negative for dizziness, weakness and paresthesias.   Psychiatric/Behavioral: Positive for mood changes. The patient is nervous/anxious.         OBJECTIVE:   /72 (BP Location: Right arm, Patient Position: Sitting, Cuff Size: Adult Regular)   Pulse 78   Temp 98.5  F (36.9  C) (Temporal)   Resp 18   Ht 1.598 m (5' 2.91\")   Wt 56.2 kg (123 lb 14.4 oz)   LMP 07/09/2023 (Approximate)   SpO2 98%   BMI 22.01 kg/m    Physical Exam  GENERAL: healthy, alert and no distress  EYES: Eyes grossly normal to inspection, PERRL and conjunctivae and sclerae normal  HENT: ear canals and TM's normal, nose and mouth without ulcers or lesions  NECK: no adenopathy, no asymmetry, masses, or scars and thyroid normal to palpation  RESP: lungs clear to auscultation - no rales, rhonchi or wheezes  CV: regular rate and rhythm, normal S1 S2, no S3 or S4, no murmur, click or rub, no peripheral edema and peripheral pulses " strong  ABDOMEN: soft, nontender, no hepatosplenomegaly, no masses and bowel sounds normal  MS: no gross musculoskeletal defects noted, no edema  SKIN: no suspicious lesions or rashes  NEURO: Normal strength and tone, mentation intact and speech normal  PSYCH: mentation appears normal, affect normal/bright      ASSESSMENT/PLAN:   Chelsea was seen today for physical and stomach issues .    Diagnoses and all orders for this visit:    Routine adult health maintenance  -     CBC with platelets; Future  -     Basic metabolic panel; Future  -     CBC with platelets  -     Basic metabolic panel    Abdominal pain, generalized - referral provided as requested  -     Adult GI  Referral - Consult Only; Future    Major depressive disorder with current active episode, unspecified depression episode severity, unspecified whether recurrent  ANTONIO (generalized anxiety disorder) - labs ordered as requested  -     TSH with free T4 reflex; Future  -     Vitamin B12; Future  -     Vitamin D Deficiency; Future  -     Iron and iron binding capacity; Future  -     Ferritin; Future  -     TSH with free T4 reflex  -     Vitamin B12  -     Vitamin D Deficiency  -     Iron and iron binding capacity  -     Ferritin    Menorrhagia with regular cycle - recommend US to evaluate. We spent time discussing considering OCPs (migraines with aura contraindicate estrogen) in addition to IUD to see if this suppresses bleeding/cramping vs discussion with GYN. Ultimately she decided to follow up with GYN.   -     US Pelvic Complete with Transvaginal; Future  -     Ob/Gyn Referral; Future    Pelvic pain in female  -     US Pelvic Complete with Transvaginal; Future  -     Ob/Gyn Referral; Future    Recurrent vaginitis - recheck for clearance  -     Wet prep - lab collect; Future  -     Wet prep - lab collect      COUNSELING:  Reviewed preventive health counseling, as reflected in patient instructions    She reports that she has never smoked. She has  never used smokeless tobacco.          Ana Luisa Armenta PA-C  [facility]

## 2023-07-19 NOTE — Clinical Note
Please abstract the following data from this visit with this patient into the appropriate field in Epic:  Tests that can be patient reported without a hard copy:  Pap smear done on this date: 6/2023 (approximately), by this group: [facility], results were normal.

## 2023-07-19 NOTE — TELEPHONE ENCOUNTER
[facility] Call Center    Phone Message    May a detailed message be left on voicemail: yes     Reason for Call: Appointment Intake    Referring Provider Name:   Diagnosis and/or Symptoms: Abdominal pain, generalized [R10.84]    Per triage notes, patient is to be seen as a GI Urgent, but is currently scheduled on 09/13/2023 with Dr. Jaqueline Campos. Current appointment is outside requested time frame. Please review for further follow up per scheduling guidelines. Thanks!     Action Taken: Message routed to:  [facility] ([facility]): GI    Travel Screening: Not Applicable

## 2023-07-20 LAB — DEPRECATED CALCIDIOL+CALCIFEROL SERPL-MC: 45 UG/L (ref 20–75)

## 2023-07-24 NOTE — TELEPHONE ENCOUNTER
Pt called back. Pt is now scheduled for a video visit on 7/26/2023 at 8:40am with Dr. Jaqueline Campos.

## 2023-07-25 NOTE — TELEPHONE ENCOUNTER
REFERRAL INFORMATION:  Referring Provider:  Ana Luisa Armenta PA-C  Referring Clinic:  [facility]  Reason for Visit/Diagnosis: Abdominal pain     FUTURE VISIT INFORMATION:  Appointment Date: 07-26-23  Appointment Time: @ 8:40am      NOTES STATUS DETAILS   OFFICE NOTE from Referring Provider Internal 07-19-23 Ana Luisa Armenta PA-C   OFFICE NOTE from Other Specialist Internal 02-13-18 Johanna Lazo PA-C   HOSPITAL DISCHARGE SUMMARY/  ED VISITS Care Everywhere 07-13-23 CRISSY Stewart   OPERATIVE REPORT NO    MEDICATION LIST Internal         ENDOSCOPY  NO    COLONOSCOPY NO    ERCP NO    EUS NO    STOOL TESTING no    PERTINENT LABS Care Everywhere/Internal  BMP: 07-19-23, 07-13-23  CBC/diff: 07-19-23, 07-13-23, 08-16-19, 08-07-18   PATHOLOGY REPORTS (RELATED) NO    IMAGING (CT, MRI, EGD, MRCP, Small Bowel Follow Through/SBT, MR/CT Enterography) Care Everywhere US abd: 07-14-23  CT abd/pelvis: 07-14-23

## 2023-07-26 ENCOUNTER — VIRTUAL VISIT (OUTPATIENT)
Dept: GASTROENTEROLOGY | Facility: CLINIC | Age: 23
End: 2023-07-26
Attending: PHYSICIAN ASSISTANT
Payer: COMMERCIAL

## 2023-07-26 ENCOUNTER — ANCILLARY PROCEDURE (OUTPATIENT)
Dept: ULTRASOUND IMAGING | Facility: CLINIC | Age: 23
End: 2023-07-26
Attending: PHYSICIAN ASSISTANT
Payer: COMMERCIAL

## 2023-07-26 ENCOUNTER — PRE VISIT (OUTPATIENT)
Dept: GASTROENTEROLOGY | Facility: CLINIC | Age: 23
End: 2023-07-26

## 2023-07-26 VITALS — HEIGHT: 63 IN | WEIGHT: 123 LBS | BODY MASS INDEX: 21.79 KG/M2

## 2023-07-26 DIAGNOSIS — R10.84 ABDOMINAL PAIN, GENERALIZED: ICD-10-CM

## 2023-07-26 DIAGNOSIS — K59.00 CONSTIPATION, UNSPECIFIED CONSTIPATION TYPE: ICD-10-CM

## 2023-07-26 DIAGNOSIS — K92.89 GAS BLOAT SYNDROME: ICD-10-CM

## 2023-07-26 DIAGNOSIS — N92.0 MENORRHAGIA WITH REGULAR CYCLE: ICD-10-CM

## 2023-07-26 DIAGNOSIS — R10.2 PELVIC PAIN IN FEMALE: ICD-10-CM

## 2023-07-26 DIAGNOSIS — R17 ELEVATED BILIRUBIN: Primary | ICD-10-CM

## 2023-07-26 PROCEDURE — 76830 TRANSVAGINAL US NON-OB: CPT | Performed by: OBSTETRICS & GYNECOLOGY

## 2023-07-26 PROCEDURE — 76856 US EXAM PELVIC COMPLETE: CPT | Performed by: OBSTETRICS & GYNECOLOGY

## 2023-07-26 PROCEDURE — 99203 OFFICE O/P NEW LOW 30 MIN: CPT | Mod: VID | Performed by: INTERNAL MEDICINE

## 2023-07-26 ASSESSMENT — ENCOUNTER SYMPTOMS
RESPIRATORY NEGATIVE: 1
DIARRHEA: 0
HEADACHES: 0
VOMITING: 0
NAUSEA: 0
CARDIOVASCULAR NEGATIVE: 1
CONSTIPATION: 1
HEARTBURN: 1
BLOOD IN STOOL: 0
PSYCHIATRIC NEGATIVE: 1
WEIGHT LOSS: 0
BLURRED VISION: 0
ABDOMINAL PAIN: 1
DIZZINESS: 0

## 2023-07-26 ASSESSMENT — PAIN SCALES - GENERAL: PAINLEVEL: NO PAIN (0)

## 2023-07-26 NOTE — PROGRESS NOTES
Virtual Visit Details    Type of service:  Video Visit   appt start: 9:10am  appt end: 9:40am    Originating Location (pt. Location): Home  Distant Location (provider location):  Off-site  Platform used for Video Visit: [facility]     GASTROENTEROLOGY NEW PATIENT VIDEO VISIT    CC/REFERRING MD:    Smiley Montoya    REASON FOR CONSULTATION:   Ana Luisa Armenta for   Chief Complaint   Patient presents with     Consult       HISTORY OF PRESENT ILLNESS:    Chelsea Feldman is a 22 year old female who is being evaluated via a billable video visit.       2 weeks ago: very intense abdo pain, called nursing line, went to ER, CT scan/US - no etiology found.  Was in a lot of pain until got two doses of morphine.  Ate bland foods, got better over the course of the week.  Still had gas pains  Over weekend on Sunday, severe pain and nausea again but did not go to ER because felt the same as the first time. abdo pain lasted until Monday. Feels ok today but hasn't eaten very much.  abdo pain located central and lower abdo. Feels like a lot of trapped gas that does not resolve with passing gas.  Has been having cyclical pain and this last episode on 7/13/23 feels like a more intense version of it.    Menses will start in one week    Feels better if avoids coffee, carbonated beverages, salty foods, sugar. Taking simethicone, helps.    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    PERTINENT PAST MEDICAL HISTORY:  (I personally reviewed this history with the patient at today's visit)   Past Medical History:   Diagnosis Date     Allergic rhinitis, cause unspecified     Allergic rhinitis     Anxiety      Contact dermatitis and  other eczema, due to unspecified cause          PREVIOUS SURGERIES: (I personally reviewed this history with the patient at today's visit)   Past Surgical History:   Procedure Laterality Date     ARTHROSCOPIC RECONSTRUCTION ANTERIOR CRUCIATE LIGAMENT Right 10/7/2016    Procedure: ARTHROSCOPIC RECONSTRUCTION ANTERIOR CRUCIATE LIGAMENT;  Surgeon: Dheeraj Zepeda MD;  Location: [facility] OR       ALLERGIES:   No Known Allergies    PERTINENT MEDICATIONS:    Current Outpatient Medications:      buPROPion (WELLBUTRIN XL) 150 MG 24 hr tablet, Take 150 mg by mouth daily, Disp: , Rfl:      busPIRone (BUSPAR) 10 MG tablet, Take 10 mg by mouth daily, Disp: , Rfl:      clindamycin (CLINDAMAX) 1 % external gel, Apply topically daily, Disp: , Rfl:      guanFACINE (INTUNIV) 1 MG TB24 24 hr tablet, Take 1 mg by mouth daily, Disp: , Rfl:      hydrOXYzine (ATARAX) 50 MG tablet, Take 50 mg by mouth daily, Disp: , Rfl:      levonorgestrel (MIRENA) 20 MCG/24HR IUD, 1 each (20 mcg) by Intrauterine route once, Disp:  , Rfl:      methylphenidate (RITALIN) 10 MG tablet, Take 10 mg by mouth daily, Disp: , Rfl:      simethicone (MYLICON) 80 MG chewable tablet, Take 80 mg by mouth every 6 hours as needed for flatulence or cramping, Disp: , Rfl:     SOCIAL HISTORY:  Social History     Occupational History     Not on file   Tobacco Use     Smoking status: Never     Smokeless tobacco: Never   Vaping Use     Vaping Use: Never used   Substance and Sexual Activity     Alcohol use: No     Alcohol/week: 0.0 standard drinks of alcohol     Drug use: No     Sexual activity: Yes     Partners: Male     Birth control/protection: I.U.D.       FAMILY HISTORY:   Family History   Problem Relation Age of Onset     No Known Problems Mother      Cancer - colorectal Father 50        in remission     Asthma Maternal Aunt         possibly weight induced     Hypertension Maternal Aunt         weight induced     Diabetes Maternal Aunt         type 2-possibly weight  induced     C.A.D. No family hx of      Cerebrovascular Disease No family hx of      Breast Cancer No family hx of         Review of Systems  Review of Systems   Constitutional: Negative for weight loss.   HENT: Negative.    Eyes: Negative for blurred vision.   Respiratory: Negative.    Cardiovascular: Negative.    Gastrointestinal: Positive for abdominal pain, constipation and heartburn. Negative for blood in stool, diarrhea, nausea and vomiting.        Sees blood she feels comes from perianal skin    Genitourinary: Negative.    Musculoskeletal: Positive for joint pain.        Pain in fingers, toes - has bunions   Skin: Negative for rash.   Neurological: Negative for dizziness and headaches.   Endo/Heme/Allergies: Negative.    Psychiatric/Behavioral: Negative.         Exam:    General appearance:  Healthy appearing adult, in no acute distress  Eyes:  Sclera anicteric  Ears, nose, mouth and throat:  No obvious external lesions of ears and nose.  Hearing intact  Neck:  Symmetric, No obvious external lesions  Respiratory:  Normal respiration, no use of accessory muscles   MSK:  No visual upper extremity, neck or facial muscle atrophy  ABD:  No visual abdominal distention, no audible borborygmi  Skin:  No rashes or jaundice   Psychiatric:  Oriented to person, place and time, Appropriate mood and affect.   Neurologic:  Peripheral muscle function and dexterity appear to be intact        PERTINENT STUDIES have been reviewed.  TSH, iron studies - done    7/14/23 CT abdo pelvis with IV contrast, read as unremarkable  7/14/23 RUQ US wnl      Impression/Recommendations:  Chelsea Feldman is a 22 year old female who presents for evaluation of intermittent abdominal pain that can last for 48h or more and she has so far been managing somewhat with diet.  ddx includes endometriosis vs constipation vs SIBO vs celiac vs GERD vs other.  Labs  Trial PPI  Trial Miralax/metamucil/ food choices to get ahead of constipation  Sees Gyne  today or tomorrow - discuss ddx of endometriosis      Jaqueline Campos MD    RTC 6 weeks    Thank you for this consultation.  It was a pleasure to participate in the care of this patient; please contact us with any further questions.      Time spent in appointment today was 30 minutes.

## 2023-07-26 NOTE — LETTER
7/26/2023         RE: Chelsea Feldman  [address]  [place] [address]        Dear Colleague,    Thank you for referring your patient, Chelsea Feldman, to the [facility] [place]. Please see a copy of my visit note below.    GASTROENTEROLOGY NEW PATIENT VIDEO VISIT    CC/REFERRING MD:    Smiley Montoya    REASON FOR CONSULTATION:   Ana Luisa Armenta for   Chief Complaint   Patient presents with    Consult       HISTORY OF PRESENT ILLNESS:    Chelsea Feldman is a 22 year old female who is being evaluated via a billable video visit.       2 weeks ago: very intense abdo pain, called nursing line, went to ER, CT scan/US - no etiology found.  Was in a lot of pain until got two doses of morphine.  Ate bland foods, got better over the course of the week.  Still had gas pains  Over weekend on Sunday, severe pain and nausea again but did not go to ER because felt the same as the first time. abdo pain lasted until Monday. Feels ok today but hasn't eaten very much.  abdo pain located central and lower abdo. Feels like a lot of trapped gas that does not resolve with passing gas.  Has been having cyclical pain and this last episode on 7/13/23 feels like a more intense version of it.    Menses will start in one week    Feels better if avoids coffee, carbonated beverages, salty foods, sugar. Taking simethicone, helps.    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    PERTINENT PAST MEDICAL HISTORY:  (I personally reviewed this history with the patient at today's visit)   Past Medical History:   Diagnosis Date    Allergic rhinitis, cause unspecified     Allergic  rhinitis    Anxiety     Contact dermatitis and other eczema, due to unspecified cause          PREVIOUS SURGERIES: (I personally reviewed this history with the patient at today's visit)   Past Surgical History:   Procedure Laterality Date    ARTHROSCOPIC RECONSTRUCTION ANTERIOR CRUCIATE LIGAMENT Right 10/7/2016    Procedure: ARTHROSCOPIC RECONSTRUCTION ANTERIOR CRUCIATE LIGAMENT;  Surgeon: Dheeraj Zepeda MD;  Location: [facility] OR       ALLERGIES:   No Known Allergies    PERTINENT MEDICATIONS:    Current Outpatient Medications:     buPROPion (WELLBUTRIN XL) 150 MG 24 hr tablet, Take 150 mg by mouth daily, Disp: , Rfl:     busPIRone (BUSPAR) 10 MG tablet, Take 10 mg by mouth daily, Disp: , Rfl:     clindamycin (CLINDAMAX) 1 % external gel, Apply topically daily, Disp: , Rfl:     guanFACINE (INTUNIV) 1 MG TB24 24 hr tablet, Take 1 mg by mouth daily, Disp: , Rfl:     hydrOXYzine (ATARAX) 50 MG tablet, Take 50 mg by mouth daily, Disp: , Rfl:     levonorgestrel (MIRENA) 20 MCG/24HR IUD, 1 each (20 mcg) by Intrauterine route once, Disp:  , Rfl:     methylphenidate (RITALIN) 10 MG tablet, Take 10 mg by mouth daily, Disp: , Rfl:     simethicone (MYLICON) 80 MG chewable tablet, Take 80 mg by mouth every 6 hours as needed for flatulence or cramping, Disp: , Rfl:     SOCIAL HISTORY:  Social History     Occupational History    Not on file   Tobacco Use    Smoking status: Never    Smokeless tobacco: Never   Vaping Use    Vaping Use: Never used   Substance and Sexual Activity    Alcohol use: No     Alcohol/week: 0.0 standard drinks of alcohol    Drug use: No    Sexual activity: Yes     Partners: Male     Birth control/protection: I.U.D.       FAMILY HISTORY:   Family History   Problem Relation Age of Onset    No Known Problems Mother     Cancer - colorectal Father 50        in remission    Asthma Maternal Aunt         possibly weight induced    Hypertension Maternal Aunt         weight induced    Diabetes Maternal Aunt          type 2-possibly weight induced    C.A.D. No family hx of     Cerebrovascular Disease No family hx of     Breast Cancer No family hx of         Review of Systems  Review of Systems   Constitutional: Negative for weight loss.   HENT: Negative.    Eyes: Negative for blurred vision.   Respiratory: Negative.    Cardiovascular: Negative.    Gastrointestinal: Positive for abdominal pain, constipation and heartburn. Negative for blood in stool, diarrhea, nausea and vomiting.        Sees blood she feels comes from perianal skin    Genitourinary: Negative.    Musculoskeletal: Positive for joint pain.        Pain in fingers, toes - has bunions   Skin: Negative for rash.   Neurological: Negative for dizziness and headaches.   Endo/Heme/Allergies: Negative.    Psychiatric/Behavioral: Negative.         Exam:    General appearance:  Healthy appearing adult, in no acute distress  Eyes:  Sclera anicteric  Ears, nose, mouth and throat:  No obvious external lesions of ears and nose.  Hearing intact  Neck:  Symmetric, No obvious external lesions  Respiratory:  Normal respiration, no use of accessory muscles   MSK:  No visual upper extremity, neck or facial muscle atrophy  ABD:  No visual abdominal distention, no audible borborygmi  Skin:  No rashes or jaundice   Psychiatric:  Oriented to person, place and time, Appropriate mood and affect.   Neurologic:  Peripheral muscle function and dexterity appear to be intact        PERTINENT STUDIES have been reviewed.  TSH, iron studies - done    7/14/23 CT abdo pelvis with IV contrast, read as unremarkable  7/14/23 RUQ US wnl      Impression/Recommendations:  Chelsea Feldman is a 22 year old female who presents for evaluation of intermittent abdominal pain that can last for 48h or more and she has so far been managing somewhat with diet.  ddx includes endometriosis vs constipation vs SIBO vs celiac vs GERD vs other.  Labs  Trial PPI  Trial Miralax/metamucil/ food choices to get ahead of  constipation  Sees Gyne today or tomorrow - discuss ddx of endometriosis      RTC 6 weeks    Thank you for this consultation.  It was a pleasure to participate in the care of this patient; please contact us with any further questions.      Time spent in appointment today was 30 minutes.          Again, thank you for allowing me to participate in the care of your patient.      Sincerely,    Jaqueline Campos MD

## 2023-07-26 NOTE — NURSING NOTE
Is the patient currently in the state of [place]? YES    Visit mode:VIDEO    If the visit is dropped, the patient can be reconnected by: VIDEO VISIT: Text to cell phone: [phone]    Will anyone else be joining the visit? NO      How would you like to obtain your AVS? MyChart    Are changes needed to the allergy or medication list? NO    Reason for visit: Consult

## 2023-07-27 ENCOUNTER — OFFICE VISIT (OUTPATIENT)
Dept: OBGYN | Facility: CLINIC | Age: 23
End: 2023-07-27
Attending: PHYSICIAN ASSISTANT
Payer: COMMERCIAL

## 2023-07-27 VITALS
OXYGEN SATURATION: 98 % | SYSTOLIC BLOOD PRESSURE: 110 MMHG | HEART RATE: 80 BPM | DIASTOLIC BLOOD PRESSURE: 69 MMHG | WEIGHT: 124.5 LBS | BODY MASS INDEX: 22.12 KG/M2

## 2023-07-27 DIAGNOSIS — R10.2 PELVIC PAIN IN FEMALE: Primary | ICD-10-CM

## 2023-07-27 PROCEDURE — 99213 OFFICE O/P EST LOW 20 MIN: CPT | Mod: 25 | Performed by: OBSTETRICS & GYNECOLOGY

## 2023-07-27 PROCEDURE — 96372 THER/PROPH/DIAG INJ SC/IM: CPT | Performed by: OBSTETRICS & GYNECOLOGY

## 2023-07-27 RX ORDER — MEDROXYPROGESTERONE ACETATE 150 MG/ML
150 INJECTION, SUSPENSION INTRAMUSCULAR
Status: ACTIVE | OUTPATIENT
Start: 2023-07-27 | End: 2024-07-21

## 2023-07-27 RX ADMIN — MEDROXYPROGESTERONE ACETATE 150 MG: 150 INJECTION, SUSPENSION INTRAMUSCULAR at 16:18

## 2023-07-27 NOTE — PROGRESS NOTES
Clinic Administered Medication Documentation        Patient was given Depo Provera. Prior to medication administration, verified patient's identity using patient s name and date of birth. Please see MAR and medication order for additional information. Patient instructed to remain in clinic for 15 minutes.    Vial/Syringe: Single dose vial. Was entire vial of medication used? Yes    NEXT INJECTION DUE: 10/12/23 - 11/9/23  Given right deltoid  Wilma Sands

## 2023-07-27 NOTE — PROGRESS NOTES
[facility] OBGYN    23 in exam    CC: pelvic pain    S:Chelsea Feldman is a 22 year old  who presents today for pelvic pain.  Patient reports about 2 weeks ago she experienced severe low abdominal pain.  It did not go away, so she called the nurse line and was advised to go to the ED to rule out appendicitis (23).  Her LMP prior to this was 7/3/23 (per ultrasound report on 23).  She was referred to GI physician who gave her treatment for constipation and advised seeing OBGYN to evaluate for other causes of pelvic pain.    In ED:  23  Pregnancy test negative  Impression    1.  No acute process within the abdomen or pelvis.      The patient reports prior to this month she was having monthly, regular menses that were light lasting about 1 week.  This month her bleeding was different.  She experienced heavier bleeding than normal and much more cramping compared to her other periods.  She saw her PCP on 23 for an annual exam and shared her experience with pain and worsening menses.  Ana Luisa Armenta ordered pelvic ultrasound and recommended referral to OBGYN.    OBGYN Hx:   OB History    Para Term  AB Living   0 0 0 0 0 0   SAB IAB Ectopic Multiple Live Births   0 0 0 0 0       Patient's last menstrual period was 2023 (approximate).  Menses:see HPI  Sexually active with male partner in past.  No intercourse for 1-2 months.  States she has had negative STD testing and negative UPT since her last intercourse  Birth control: Mirena IUD placed   Uses condoms 50-75% of the time with partners  STD Hx:chlamydia 5 years ago  Pap hx:23 NILM    PMH:   Past Medical History:   Diagnosis Date    Allergic rhinitis, cause unspecified     Allergic rhinitis    Anxiety     Contact dermatitis and other eczema, due to unspecified cause        PSH:  Past Surgical History:   Procedure Laterality Date    ARTHROSCOPIC RECONSTRUCTION ANTERIOR CRUCIATE LIGAMENT Right 10/7/2016     Procedure: ARTHROSCOPIC RECONSTRUCTION ANTERIOR CRUCIATE LIGAMENT;  Surgeon: Dheeraj Zepeda MD;  Location: [facility] OR       Meds:  Current Outpatient Medications   Medication    buPROPion (WELLBUTRIN XL) 150 MG 24 hr tablet    busPIRone (BUSPAR) 10 MG tablet    clindamycin (CLINDAMAX) 1 % external gel    guanFACINE (INTUNIV) 1 MG TB24 24 hr tablet    hydrOXYzine (ATARAX) 50 MG tablet    levonorgestrel (MIRENA) 20 MCG/24HR IUD    methylphenidate (RITALIN) 10 MG tablet    omeprazole (PRILOSEC) 20 MG DR capsule    psyllium (METAMUCIL/KONSYL) capsule    simethicone (MYLICON) 80 MG chewable tablet     Current Facility-Administered Medications   Medication    medroxyPROGESTERone (DEPO-PROVERA) injection 150 mg       Allergies:No Known Allergies    SH:Dneies any tobacco use      O: Patient Vitals for the past 24 hrs:   BP Pulse SpO2 Weight   07/27/23 1531 110/69 80 98 % 56.5 kg (124 lb 8 oz)   ]  Exam:  General-awake, alert, answering questions appropriately, appears comfortable  CV- regular rate  Lung- breathing comfortably on room air    Imaging and Labs:  Gynecological Ultrasound Report  Pelvic U/S - Transabdominal and Transvaginal   [facility] Ob[facility]etric[facility]necology  Referring Provider: Ana Luisa Armenta PA-C  Sonographer:  Juan Beck RDMS  Indication: Bleeding/Menses- Menorrhagia (heavy menses) with regular cycle, pelvic pain in female, IUD check  LMP: 07/03/2023     Gynecological Ultrasonography:   Uterus: anteverted. Contour is smooth/regular.  Size: 7.4 x 3.5 x 3.4 cm  Endometrium: Thickness Total 3.4 mm  Findings: IUD in place, free fluid in CDS and adnexal regions     Right Ovary: 1.6 x 1.4 x 1.9 cm. Wnl and Simple parovarian cyst 2.0 x 1.8 x 1.7 cm  Left Ovary: 2.8 x 2.2 x 1.8 cm. Complex cyst 2.2 x 1.6 x 1.9 cm  Cul de Sac Free Fluid: Moderate     Impression:      The uterus and ovaries were visualized and no abnormalities were seen. A right paratubal cyst is identified.  The complex cyst  noted on the left ovary appears to be a collapsing cyst and likely represents a recently ruptured cyst or corpus luteum. Both may account for the free fluid seen.  An IUD is located within the uterine cavity.     Kimberly Hernández MD    A&P: Chelsea Feldman is a 22 year old  who presents today for pelvic pain.     (R10.2) Pelvic pain in female  (primary encounter diagnosis)  Comment: Patient with significant mid cycle pelvic pain.  Ultrasound showing resolving cyst vs. Ruptured cyst.  Moderate free fluid supports diagnosis of ruptured ovarian cyst.  Small simple right paraovarian cyst unlikely to contribute to pain. Reviewed ultrasound findings with patient. Discussed with patient Mirena IUD does not reliably suppress ovulation in all users.  Patient has history of migraine with aura, thus is not a good candidate for estrogen containing medications.  Discussed options for ovulatory suppression such as POPs and depo provera.  Patient started new job recently and has found the pain and bleeding disrupting her ability to work.  Discussed best option for reliable ovulatory suppression is depo provera.  Patient would like to move forward with this option today.  Plan: medroxyPROGESTERone (DEPO-PROVERA) injection         150 mg        Follow up if she feels depo provera is not helping with pain.    Sandy Aggarwal MD

## 2023-07-28 ENCOUNTER — TELEPHONE (OUTPATIENT)
Dept: GASTROENTEROLOGY | Facility: CLINIC | Age: 23
End: 2023-07-28
Payer: COMMERCIAL

## 2023-07-28 NOTE — TELEPHONE ENCOUNTER
KARY and sent mychart for patient to schedule:    6 wk follow-up appointment with Gen GI provider- Eddy Serrato, Areli Santo, Sadie Croew, Keri Silva- (around 9/9/23). [facility], in person or virtual. Per Dr. Campos's follow-up order.

## 2023-09-27 NOTE — PROGRESS NOTES
GASTROENTEROLOGY RETURN PATIENT VIRTUAL VISIT      How would you like to obtain your AVS? MyChart  If the video visit is dropped, the invitation should be resent by: Text to cell phone: [phone]  Will anyone else be joining your video visit? No  ..    Video-Visit Details    Type of service:  Video Visit    Video Start Time (time video started): 0833    Video End Time (time video stopped): 0846    Originating Location (pt. Location): Home      Distant Location (provider location):  Off-site    Mode of Communication:  Video Conference via [employer]    Physician has received verbal consent for a Video Visit from the patient? Yes      GASTROENTEROLOGY RETURN PATIENT VIDEO VISIT    CC/REFERRING MD:    Smiley Montoya      REASON FOR CONSULTATION:    Follow Up (Abdominal pain)      HISTORY OF PRESENT ILLNESS:  Chelsea Feldman is 23 year old female who presents for a follow up. Was seen by Dr. Campos for acute severe abdominal pain in July. Patient stated that she is feeling well now. Reported correlation between abdominal discomfort and bloating and her menstrual cycle (worse during and after ovulation). Said that she feels better if avoids coffee, carbonated beverages, salty foods, sugar. Taking simethicone as needed. At times, her stools are hard. Having 1-2 BMs every day. No melena or hematochezia.  Denies nausea or vomiting. Takes omeprazole for occasional acid reflux as needed.      PREVIOUS ENDOSCOPY:  None  RADIOLOGY:    7/14/23 CT scan of abdomen  Impression  1.  No acute process within the abdomen or pelvis.    7/19/23 Limited abdominal ultrasound  Impression  1.  Normal right upper quadrant. No gallstone or biliary dilatation.    HISTORY:   has a past medical history of Allergic rhinitis, cause unspecified, Anxiety, Contact dermatitis and other eczema, due to unspecified cause, and Migraine with aura and without status migrainosus, not intractable.     has a past surgical history that includes  Arthroscopic reconstruction anterior cruciate ligament (Right, 10/7/2016).     reports that she has never smoked. She has never used smokeless tobacco. She reports that she does not drink alcohol and does not use drugs.    family history includes Asthma in her maternal aunt; Cancer - colorectal (age of onset: 50) in her father; Diabetes in her maternal aunt; Hypertension in her maternal aunt; No Known Problems in her mother.    ALLERGIES:   No Known Allergies    PERTINENT MEDICATIONS:    Current Outpatient Medications:     buPROPion (WELLBUTRIN XL) 150 MG 24 hr tablet, Take 150 mg by mouth daily, Disp: , Rfl:     busPIRone (BUSPAR) 10 MG tablet, Take 10 mg by mouth daily, Disp: , Rfl:     clindamycin (CLINDAMAX) 1 % external gel, Apply topically daily, Disp: , Rfl:     guanFACINE (INTUNIV) 1 MG TB24 24 hr tablet, Take 1 mg by mouth daily, Disp: , Rfl:     hydrOXYzine (ATARAX) 50 MG tablet, Take 50 mg by mouth daily, Disp: , Rfl:     levonorgestrel (MIRENA) 20 MCG/24HR IUD, 1 each (20 mcg) by Intrauterine route once, Disp:  , Rfl:     methylphenidate (RITALIN) 10 MG tablet, Take 10 mg by mouth daily, Disp: , Rfl:     omeprazole (PRILOSEC) 20 MG DR capsule, Take 1 capsule (20 mg) by mouth daily, Disp: 31 capsule, Rfl: 3    psyllium (METAMUCIL/KONSYL) capsule, Take 1 capsule by mouth daily, Disp: 90 capsule, Rfl: 3    simethicone (MYLICON) 80 MG chewable tablet, Take 80 mg by mouth every 6 hours as needed for flatulence or cramping, Disp: , Rfl:     Current Facility-Administered Medications:     medroxyPROGESTERone (DEPO-PROVERA) injection 150 mg, 150 mg, Intramuscular, Q90 Days, Sandy Aggarwal MD, 150 mg at 07/27/23 1618      ROS:     No fevers or chills  No weight loss  No blurry vision, double vision or change in vision  No sore throat  No lymphadenopathy  No headache, paraesthesias, or weakness in a limb  No shortness of breath or wheezing  No chest pain or pressure  No arthralgias or myalgias  No rashes or skin  changes  No odynophagia or dysphagia  No  hematochezia, melena  No dysuria, frequency or urgency  No hot/cold intolerance or polyria  No anxiety or depression    PHYSICAL EXAMINATION:  Wt:   Wt Readings from Last 2 Encounters:   07/27/23 56.5 kg (124 lb 8 oz)   07/26/23 55.8 kg (123 lb)      Physical Exam  Vitals reviewed: There were no vitals taken for this visit.   Constitutional: aaox3, cooperative, pleasant, not dyspneic/diaphoretic, no acute distress  Eyes: Sclera anicteric/injected  Respiratory: Unlabored breathing, speaking in full sentences.   Psych: Normal affect, speech is clear and appropriate.Neatly groomed    RECENT LABS:   Recent Labs   Lab Test 07/19/23  1535 08/16/19  1534   WBC 6.2 5.5   HGB 13.4 14.0   HCT 37.0 39.6    257     Recent Labs   Lab Test 12/23/20  0910 08/16/19  1534   ALT 19 19   AST 13 15     Recent Labs   Lab Test 07/19/23  1535 07/09/18  0000   CR 0.69 0.76     TSH   Date Value Ref Range Status   07/19/2023 1.39 0.30 - 4.20 uIU/mL Final         ASSESSMENT/PLAN:    ICD-10-CM    1. Abdominal pain, generalized  R10.84 Adult GI Clinic Follow-Up Order      2. Gas bloat syndrome  K92.89 Adult GI Clinic Follow-Up Order         Chelsea Feldman is a 23 year old female who presents for a follow up on intermittent episodes of diffuse abdominal pain, which she relates to ovulation as symptoms are linked to her menstrual period. Reported occasional hard stools with some straining, but no melena or hematochezia. I suggested to take Miralax 1/2 of scoop as needed. She said that taking metamucil gummies helps. Was educated on recommended daily intake of fiber.  Patient also mentioned occasional acid reflux after consuming carbonated beverages or spicy foods. Takes omeprazole as needed. Suggested to try TUMs or famotidine instead.  Noted that  she was consulted by gynecology for mid cycle pelvic pain. Ultrasound showing resolving cyst or ruptured cyst. Moderate free fluid supports  diagnosis of ruptured ovarian cyst. Small simple right paraovarian cyst unlikely to contribute to pain. Started on Depo-Provera.       Follow up as needed. GI will sign off.  This note was created with Dragon voice recognition software. Inadvertent minor typographic errors may occur in transcription. Feel free to contact the provider if you have any questions.  I sincerely appreciate an opportunity to provide consultation for this pleasant patient.    ANGLE Rodriguez  [facility],  Gastroenterology,  [place], [place]

## 2023-10-02 ENCOUNTER — TRANSFERRED RECORDS (OUTPATIENT)
Dept: HEALTH INFORMATION MANAGEMENT | Facility: CLINIC | Age: 23
End: 2023-10-02
Payer: COMMERCIAL

## 2023-10-11 ENCOUNTER — VIRTUAL VISIT (OUTPATIENT)
Dept: GASTROENTEROLOGY | Facility: CLINIC | Age: 23
End: 2023-10-11
Payer: COMMERCIAL

## 2023-10-11 DIAGNOSIS — R10.84 ABDOMINAL PAIN, GENERALIZED: ICD-10-CM

## 2023-10-11 DIAGNOSIS — K92.89 GAS BLOAT SYNDROME: ICD-10-CM

## 2023-10-11 PROCEDURE — 99214 OFFICE O/P EST MOD 30 MIN: CPT | Mod: VID | Performed by: NURSE PRACTITIONER

## 2023-10-11 NOTE — PATIENT INSTRUCTIONS
It was a pleasure taking care of you today.  I've included a brief summary of our discussion and care plan from today's visit below.  Please review this information with your primary care provider.  ______________________________________________________________________    My recommendations are summarized as follows:    As we discussed today, there are no indications for additional studies/ tests for evaluation of your symptoms from GI standpoint. I would recommend symptomatic management.    2. Please review some information on bloating and constipation below. Some of my patients found helpful to take IBGard (peppermint oil), saurabh seeds oil, camomile, and valerian root for abdominal bloating and cramping.     3. You can continue taking fiber supplement but in general, doses less than 5 gram a day are not effective. Fiber powder (Citrucel, metamucil, FiberCon) are more cost effective than gummies or capsules.    4. Continue to follow up with you gynecologist.    Return to GI Clinic as needed   ______________________________________________________________________    BLOATING AND GAS  Some people feel that they pass too much gas or burp too frequently, both of which can be a source of embarrassment and discomfort. The average adult produces about one to three pints of gas each day, which is passed through the anus 14 to 23 times per day. Burping occasionally before or after meals is also normal.  The amount of gas produced by the body depends upon your diet and other individual factors. However, most people who complain of excessive gas do not produce more gas than the average person. Instead, they are more aware of normal amounts of gas. On the other hand, certain foods and medical conditions can cause you to make excessive amounts of gas.    There are two primary sources of intestinal gas: gas that is ingested (mostly swallowed air) and gas that is produced by bacteria in the colon.   Air swallowing is the major  source of gas in the stomach. It is normal to swallow a small amount of air when eating and drinking and when swallowing saliva. You may swallow larger amounts of air when eating food rapidly, gulping liquids, chewing gum, or smoking.     Bacterial production -- The colon normally provides a home for billions of harmless bacteria, some of which support the health of the bowel. Certain carbohydrates are incompletely digested by enzymes in the stomach and intestines, allowing bacteria to digest them. For example, cabbage, [place] sprouts, and broccoli contain raffinose, a carbohydrate that is poorly digested. These foods tend to cause more gas and flatulence because the raffinose is digested by bacteria once it reaches the colon. The by-products of this process include odorless gases, such as carbon dioxide, hydrogen, and methane. Minor components of gas have an unpleasant odor, including trace amounts of sulfur.  Some people are not able to digest certain carbohydrates. A classic example is lactose, the major sugar contained in dairy products . Thus, consuming large amounts of lactose may lead to increased gas production, along with cramping and diarrhea.  Starch and soluble fiber can also contribute increase gas. Potatoes, corn, noodles, and wheat produce gas, while rice does not. Soluble fiber (found in oat bran, peas and other legumes, beans, and most fruit) also causes gas. Some laxatives contain soluble fiber and may cause gas, particularly during the first few weeks of use.   Certain diseases can also cause excessive bloating and gas. For example, people with diabetes or scleroderma may, over time, have slowing in the activity of the small intestine. This can lead to bacterial overgrowth within the bowel, with poor digestion of carbohydrates and other nutrients. However, even in the absence of apparent disease, some people tend to harbor large numbers of bacteria in their small bowel and are prone to develop  excessive gas.   Most people with gas and bloating do not need to have any testing. However, symptoms such as diarrhea, weight loss, abdominal pain, anemia, blood in the stool, lack of appetite, fever, or vomiting can be warning signs of a more serious problem; people with one or more of these symptoms usually require testing.   Medical conditions  Medical conditions that may increase intestinal gas, bloating or gas pain include the following:  Chronic intestinal disease. Excess gas is often a symptom of chronic intestinal conditions, such as diverticulitis, ulcerative colitis or Crohn's disease.  Small bowel bacterial overgrowth. An increase or change in the bacteria in the small intestine can cause excess gas, diarrhea and weight loss.  Food intolerances. Gas or bloating may occur if your digestive system can't break down and absorb certain foods, such as the sugar in dairy products (lactose) or proteins such as gluten in wheat and other grains.  Constipation. Constipation may make it difficult to pass gas.    Several measures can help to reduce bothersome gas.   Chronic, repeated belching can occur if you swallow large amounts of air (ie, aerophagia). Aerophagia is typically an unconscious process, and is often associated with emotional stress. Treatment focuses on decreasing air swallowing by reducing anxiety, when it is considered to be a cause, as well as on eating slowly without gulping and avoiding carbonated beverages. Avoid chewing gum, sucking on hard candies and drinking through a straw. These activities can cause you to swallow more air.  Diet recommendations --     High-fiber foods. High-fiber foods that can cause gas include beans, onions, broccoli, [place] sprouts, cabbage, cauliflower, artichokes, asparagus, pears, apples, peaches, prunes, whole wheat and bran. You can experiment with which foods affect you most. You may avoid high-fiber foods for a couple of weeks and gradually add them back. Talk  "to your doctor to ensure you maintain a healthy intake of dietary fiber.  Dairy. Reducing dairy products from your diet can lessen symptoms. You also may try dairy products that are lactose-free or take milk products supplemented with lactase to help with digestion.  Sugar substitutes. Eliminate or reduce sugar substitutes, or try a different substitute.  Fried or fatty foods. Dietary fat delays the clearance of gas from the intestines. Cutting back on fried or fatty foods may reduce symptoms.  Carbonated beverages. Avoid or reduce your intake of carbonated beverages.  Fiber supplements. If you use a fiber supplement, talk to your doctor about the amount and type of supplement that is best for you.  Water. To help prevent constipation, drink water with your meals, throughout the day and with fiber supplements.  Certain foods contain specific carbohydrates called \"FODMAPs\" (fermentable oligo-, di-, and monosaccharides and polyols). FODMAPs are poorly absorbed and can result in bloating and gas production in some people.    Over-the-counter medications -- Try an over-the-counter product that contains Simethicone, such as certain antacids (eg, Maalox Anti-Gas, Mylanta Gas, Gas-X, Phazyme). Also, you can try an over-the-counter product that contains activated charcoal (eg, CharcoCaps, CharcoAid) or Beano, which is an over-the-counter preparation that helps to breakdown certain complex carbohydrates. This treatment may be effective in reducing gas after eating beans or other vegetables that contain raffinose. Another option is  Pepto-Bismol to reduce the odor of unpleasant-smelling gas.If the odor from passing gas concerns you, limiting foods high in sulfur-containing compounds -- such as broccoli, [place] sprouts, cabbage, cauliflower, beer and foods high in protein -- may reduce distinctive odors.  Lactase supplements (Lactaid, Digest Dairy Plus, others) help you digest the sugar in dairy products (lactose). These " reduce gas symptoms if you're lactose intolerant.          Constipation refers to a change in bowel habits, but it has varied meanings. Stools may be too hard or too small, difficult to pass, or infrequent (less than three times per week). People with constipation may also notice a frequent need to strain and a sense that the bowels are not empty.  Constipation is a very common problem. Each year more than 2.5 million Americans visit their healthcare provider for relief from this problem. Many factors can contribute to or cause constipation, although in most people, no single cause can be found. In general, constipation occurs more frequently as you get older.     Treatment for constipation includes changing some behaviors, eating foods high in fiber, and using medications if needed.  Behavior changes -- The bowels are most active following meals, and this is often the time when stools will pass most readily. If you ignore your body's signals to have a bowel movement, the signals become weaker and weaker over time.By paying close attention to these signals, you may have an easier time moving your bowels. Drinking a caffeine-containing beverage in the morning may also be helpful.  Increase fiber -- Increasing fiber in your diet may reduce or eliminate constipation. The recommended amount of dietary fiber is 20 to 35 grams of fiber per day. By reading the product information panel on the side of the package, you can determine the number of grams of fiber per serving .Many fruits and vegetables can be particularly helpful in preventing and treating constipation .This is especially true of citrus fruits, prunes, and prune juice. Some breakfast cereals are also an excellent source of dietary fiber.  Start your day off with a high-fiber breakfast such as whole grain oatmeal sprinkled with  raisins, blueberries, pecans or macadamia nuts.   Slice up raw veggies (carrots, celery, bell peppers) and fruit (apples, pears) and  keep  them in baggies for quick high-fiber snacks.   Munch on a small handful of nuts, such as peanuts, walnuts, and almonds.   Look for individually wrapped prunes in the grocery store for a quick fiber snack.   When buying frozen veggies, look for ones that have been  flash frozen.    Add half a cup of garbanzo or black beans or peas to your salad to add fiber, texture, and flavor.   EXAMPLES OF HIGH-FIBER FOODS   1 large apple or pear (5g)   1 cup Raisin Bran (5g)     cup raspberries (9g)   1 cup cooked broccoli (5g)   23 almonds (3.5g)   1 cup black beans (15g)   2 brazil nuts (2.5g)   1 cup peas (16g)    Anti-constipation fruit paste:  Ingredients  1 cup pitted prunes  1 cup raisins  1 cup pitted dates  1/2 cup orange juice  2/3 cup prune juice  Tip:  For even more fiber add 1 cup of natural wheat bran to the fruit mixture.  Directions (Makes 25 servings)  Combine all ingredients in a bowl and soak overnight in the refrigerator. Blend in  or  until smooth. Keep in the refrigerator (can be kept frozen in small containers).   Serving size: 2 tablespoons. Spread it on toast or eat from a spoon.  Caution: May cause bloating and abdominal cramping. Start with smaller portion and increase it gradually over a few weeks as tolerate.    Medications:  Bulk forming -- These include natural fiber and commercial fiber preparations such as Psyllium (Konsyl; Metamucil; Perdiem), Methylcellulose (Citrucel),  or Wheat dextrin (Benefiber);  You should increase the dose of fiber supplements slowly to prevent gas and cramping, and you should always take the supplement with plenty of fluid.  2. Hyperosmolar medications  -- such as   ?Polyethylene glycol (MiraLAX, GlycoLax)  ? Lactulose  ? Sorbitol  Polyethylene glycol is generally preferred since it does not cause gas or bloating and is available without a prescription. Lactulose and sorbitol can produce gas and bloating. Sorbitol works as well as lactulose and  is much less expensive.  3.Saline laxatives -- such as Milk of Magnesia and magnesium citrate (Evac-Q-Mag) act similarly to the hyperosmolar drugs.  4. Stimulant drugs -- include Senna (eg, Black Draught, Ex-Lax, Senokot) and Bisacodil (eg, Correctol, Doxidan, Dulcolax).     Constipation treatments to avoid:  ? Emollients - Emollient laxatives, principally mineral oil, soften stools by moisturizing them. However, other treatments have fewer risks and equal benefit.  ? Natural products - A wide variety of natural products are advertised for constipation. Some of them contain the active ingredients found in commercially available laxatives. However, their dose and purity may not be carefully controlled. Thus, these products are not generally recommended.  ? A variety of home-made enema preparations have been used throughout the years, such as soapsuds, hydrogen peroxide, and household detergents. These can be extremely irritating to the lining of the intestine and should be avoided.           ______________________________________________________________________    Who do I call with any questions after my visit?  Please be in touch if there are any further questions that arise following today's visit.  There are multiple ways to contact your gastroenterology care team.      During business hours, you may reach a Gastroenterology nurse at [phone], option 3.     To schedule or reschedule an appointment, please call [phone].   To schedule your imaging studies (CT, MRI, ultrasound)  call [phone] (or toll-free # [phone])  To schedule your lab work at [facility], please call [phone]    You can always send a secure message through [employer].  [employer] messages are answered by your nurse or doctor typically within 24 hours.  Please allow extra time on weekends and holidays.      For urgent/emergent questions after business hours, you may reach the on-call GI Fellow by  contacting the [facility]  at [phone].    In order for your refill to be processed in a timely fashion, it is your responsibility to ensure you follow the recommendations from your provider regarding your laboratory studies and follow up appointments.       How will I get the results of any tests ordered?    You will receive all of your results.  If you have signed up for simfyhart, any tests ordered at your visit will be available to you after your physician reviews them.  Typically this takes 1-2 weeks.  If there are urgent results that require a change in your care plan, your physician or nurse will call you to discuss the next steps.   What is [employer]?  [facility] is a secure way for you to access all of your healthcare records from the [facility].  It is a web based computer program, so you can sign on to it from any location.  It also allows you to send secure messages to your care team.  I recommend signing up for [employer] access if you have not already done so and are comfortable with using a computer.    How to I schedule a follow-up visit?  If you did not schedule a follow-up visit today, please call [phone] to schedule a follow-up office visit.      Sincerely,  ANGLE Rodriguez,  [facility],  Division of Gastroenterology   ([facility])

## 2023-10-18 ENCOUNTER — ALLIED HEALTH/NURSE VISIT (OUTPATIENT)
Dept: FAMILY MEDICINE | Facility: CLINIC | Age: 23
End: 2023-10-18
Payer: COMMERCIAL

## 2023-10-18 DIAGNOSIS — Z23 ENCOUNTER FOR IMMUNIZATION: Primary | ICD-10-CM

## 2023-10-18 PROCEDURE — 90686 IIV4 VACC NO PRSV 0.5 ML IM: CPT

## 2023-10-18 PROCEDURE — 90471 IMMUNIZATION ADMIN: CPT

## 2023-10-18 PROCEDURE — 99207 PR NO CHARGE NURSE ONLY: CPT

## 2023-10-18 NOTE — PROGRESS NOTES
Prior to immunization administration, verified patients identity using patient s name and date of birth. Please see Immunization Activity for additional information.     Screening Questionnaire for Adult Immunization    Are you sick today?   No   Do you have allergies to medications, food, a vaccine component or latex?   No   Have you ever had a serious reaction after receiving a vaccination?   No   Do you have a long-term health problem with heart, lung, kidney, or metabolic disease (e.g., diabetes), asthma, a blood disorder, no spleen, complement component deficiency, a cochlear implant, or a spinal fluid leak?  Are you on long-term aspirin therapy?   No   Do you have cancer, leukemia, HIV/AIDS, or any other immune system problem?   No   Do you have a parent, brother, or sister with an immune system problem?   No   In the past 3 months, have you taken medications that affect  your immune system, such as prednisone, other steroids, or anticancer drugs; drugs for the treatment of rheumatoid arthritis, Crohn s disease, or psoriasis; or have you had radiation treatments?   No   Have you had a seizure, or a brain or other nervous system problem?   No   During the past year, have you received a transfusion of blood or blood    products, or been given immune (gamma) globulin or antiviral drug?   No   For women: Are you pregnant or is there a chance you could become       pregnant during the next month?   No   Have you received any vaccinations in the past 4 weeks?   No     Immunization questionnaire answers were all negative.    I have reviewed the following standing orders:   This patient is due and qualifies for the Influenza vaccine.    Click here for Influenza Vaccine Standing Order    I have reviewed the vaccines inclusion and exclusion criteria; No concerns regarding eligibility.     Patient instructed to remain in clinic for 15 minutes afterwards, and to report any adverse reactions.     Screening performed by  Nini Zapata RN on 10/18/2023 at 9:04 AM.

## 2023-10-20 ENCOUNTER — OFFICE VISIT (OUTPATIENT)
Dept: OBGYN | Facility: CLINIC | Age: 23
End: 2023-10-20
Payer: COMMERCIAL

## 2023-10-20 VITALS
WEIGHT: 124.6 LBS | DIASTOLIC BLOOD PRESSURE: 72 MMHG | HEART RATE: 78 BPM | OXYGEN SATURATION: 98 % | BODY MASS INDEX: 22.14 KG/M2 | SYSTOLIC BLOOD PRESSURE: 110 MMHG

## 2023-10-20 DIAGNOSIS — R10.2 PELVIC PAIN IN FEMALE: Primary | ICD-10-CM

## 2023-10-20 PROCEDURE — 99213 OFFICE O/P EST LOW 20 MIN: CPT | Performed by: OBSTETRICS & GYNECOLOGY

## 2023-10-20 NOTE — PROGRESS NOTES
[facility]    CC: follow up, pelvic pain    S:Chelsea Feldman is a 23 year old  who presents today for follow up of pelvic pain.  Patient initially seen on 23 with pelvic pain and a history of ruptured ovarian cyst.  She was started on depo provera injection.  Patient reports she does not feel her pain has improved.  She states that since March she has been experiencing mid cycle cramping and significant cramping during her period, which occurs monthly.  She feels the intensity really ramped up in May.  She states she was mid cycle last weekend and experienced very significant sharp cramping that waxed and waned in severity over a few hours.  Then Monday she had a little bit of spotting and cramping.  Her predicted period is in 7 days.  She does not want to get another depo shot today.  She states she has had an IUD since the age of 17.  It was initially placed to manage painful periods.  Her current Mirena IUD was placed 2/15/2021.  Per note from Dr. Castaneda on that day the patient had her prior Mirena IUD removed and replaced at the 3.5 year maria luisa due to recurrence of painful periods.      O: Patient Vitals for the past 24 hrs:   BP Pulse SpO2 Weight   10/20/23 1304 110/72 78 98 % 56.5 kg (124 lb 9.6 oz)   ]  Exam:  General- awake, alert, answering questions appropriately, appears comfortable  CV- regular rate  Lung- breathing comfortably on room air    A&P: Chelsea Feldman is a 23 year old  who presents today for follow up of pelvic pain.      (R10.2) Pelvic pain in female  (primary encounter diagnosis)  Comment: Discussed with patient possible causes of her pain.  These include ovarian cysts, but that is less likely given the recent depo shot and rate of anovulation with depo provera therapy.  Explained also on the differential is endometriosis.  Reviewed diagnosis is by laparoscopy.  Explained two additional medications to treat endometriosis if pain refractory to  treatment with birth control include Orlissa and depo lupron.  Discussed side effects are hot flashes, mood swings, night sweats.  Also discussed option of removal and replacement of current IUD with another Mirena vs. Slightly smaller IUD called Kyleena.  Paratubal cyst present on prior ultrasound.  I would not expect this cyst to contribute to her current cyclic pain.  If it is causing tubal torsion a paraovarian cyst can cause pain, but I would expect the pain to be acute, severe, and no wax and wane OR be cyclic with her menstrual cycle. This type of cyst could be removed during a future diagnostic laparoscopy if that is performed in the future.  Plan: US Transvaginal Pelvic Non-OB        Patient does not desire to continue depo provera.  She is open to repeat ultrasound.  Also discussed option to try POPs although rate of anovulation with this medication is lower compared to depo provera.    Sandy Aggarwal MD

## 2023-11-08 ENCOUNTER — ANCILLARY PROCEDURE (OUTPATIENT)
Dept: ULTRASOUND IMAGING | Facility: CLINIC | Age: 23
End: 2023-11-08
Attending: OBSTETRICS & GYNECOLOGY
Payer: COMMERCIAL

## 2023-11-08 ENCOUNTER — OFFICE VISIT (OUTPATIENT)
Dept: OBGYN | Facility: CLINIC | Age: 23
End: 2023-11-08
Attending: OBSTETRICS & GYNECOLOGY
Payer: COMMERCIAL

## 2023-11-08 VITALS
BODY MASS INDEX: 21.85 KG/M2 | WEIGHT: 123 LBS | HEART RATE: 76 BPM | DIASTOLIC BLOOD PRESSURE: 76 MMHG | OXYGEN SATURATION: 98 % | SYSTOLIC BLOOD PRESSURE: 118 MMHG

## 2023-11-08 DIAGNOSIS — Z23 HIGH PRIORITY FOR 2019-NCOV VACCINE: Primary | ICD-10-CM

## 2023-11-08 DIAGNOSIS — R10.2 PELVIC PAIN IN FEMALE: ICD-10-CM

## 2023-11-08 PROCEDURE — 99213 OFFICE O/P EST LOW 20 MIN: CPT | Mod: 25

## 2023-11-08 PROCEDURE — 90480 ADMN SARSCOV2 VAC 1/ONLY CMP: CPT

## 2023-11-08 PROCEDURE — 76830 TRANSVAGINAL US NON-OB: CPT | Performed by: OBSTETRICS & GYNECOLOGY

## 2023-11-08 PROCEDURE — 91320 SARSCV2 VAC 30MCG TRS-SUC IM: CPT

## 2023-11-08 RX ORDER — CLONIDINE HYDROCHLORIDE 0.1 MG/1
TABLET ORAL
COMMUNITY
Start: 2023-10-30

## 2023-11-08 ASSESSMENT — PAIN SCALES - GENERAL: PAINLEVEL: EXTREME PAIN (8)

## 2023-11-08 ASSESSMENT — PATIENT HEALTH QUESTIONNAIRE - PHQ9: SUM OF ALL RESPONSES TO PHQ QUESTIONS 1-9: 8

## 2023-11-10 NOTE — PROGRESS NOTES
CC: US follow up for pelvic pain  S: Lary is a 24 yo  who is here to follow up for US results for pelvic pain. Lary reports she has a mirena IUD in place since 2/15/21. She is unable to take estrogen containing agents due to migraine with aura. She was having pelvic pain and completed a trial of depo in july. She did not like the side effects of depo and elected to not utilize it again. She did not have a period this month. The pain she is feeling is mid cycle stabbing/cramping it waxes and wanes and was present despite not having a cycle this month. Ibuprofen and heat is mildly helpful to pain, nothing resolves pain. Food triggers like salt, sugar and not taking metamucil makes sx worse. She had a consultation with Dr. Aggarwal 10/20 where it was discussed as a likely differential that she has endometriosis.    O:/76   Pulse 76   Wt 55.8 kg (123 lb)   LMP 2023   SpO2 98%   BMI 21.85 kg/m    Past Medical History:   Diagnosis Date    Allergic rhinitis, cause unspecified     Allergic rhinitis    Anxiety     Contact dermatitis and other eczema, due to unspecified cause     Migraine with aura and without status migrainosus, not intractable      Past Surgical History:   Procedure Laterality Date    ARTHROSCOPIC RECONSTRUCTION ANTERIOR CRUCIATE LIGAMENT Right 10/7/2016    Procedure: ARTHROSCOPIC RECONSTRUCTION ANTERIOR CRUCIATE LIGAMENT;  Surgeon: Dheeraj Zepeda MD;  Location: [facility] OR     Current Outpatient Medications   Medication    buPROPion (WELLBUTRIN XL) 150 MG 24 hr tablet    cloNIDine (CATAPRES) 0.1 MG tablet    levonorgestrel (MIRENA) 20 MCG/24HR IUD    methylphenidate (RITALIN) 10 MG tablet    psyllium (METAMUCIL/KONSYL) capsule    simethicone (MYLICON) 80 MG chewable tablet    busPIRone (BUSPAR) 10 MG tablet    clindamycin (CLINDAMAX) 1 % external gel    guanFACINE (INTUNIV) 1 MG TB24 24 hr tablet    hydrOXYzine (ATARAX) 50 MG tablet    omeprazole (PRILOSEC) 20 MG DR capsule      Current Facility-Administered Medications   Medication    medroxyPROGESTERone (DEPO-PROVERA) injection 150 mg      No Known Allergies    Gynecological Ultrasound Report  Pelvic U/S - Transvaginal   [facility]  Referring Provider: Sandy Aggarwal MD   Sonographer:  Juan Beck RDMS  Indication: Pelvic pain with periods and mid cycle, IUD in place, pelvic pain in female  LMP: Patient's last menstrual period was 2023.  History:   Gynecological Ultrasonography:   Uterus: anteflexed. Contour is smooth/regular.  Size: 6.7 x 3.4 x 3.3 cm  Endometrium: Thickness Total 3.1 mm  Findings: IUD in uterine cavity  Right Ovary: 1.8 x 1.7 x 3.1 cm. Simple cysts 1.6 x 1.3 x 1.3 cm & 1.2 x 0.6 x 0.9 cm and paraovarian cyst 2.0 x 1.8 x 1.9 cm  Left Ovary: 2.4 x 2.1 x 1.9 cm. Wnl  Cul de Sac Free Fluid: Trace  Technique: Transvaginal Imaging performed     Impression:         The uterus and ovaries were visualized and no abnormalities were seen.  An IUD is located within the uterine cavity.  Two small simple cyst were noted in the right ovary, consistent with a physiological cysts.  Recommend clinical correlation.     LADI BUSTILLO MD    Alert pleasant female well appearing NAD  Physical exam deferred   RRR   Normal bp and pulse     A:Lary is a 22 yo  who is here to follow up for US results for pelvic pain.  P:  -discussed limitations not using estrogen containing agents to shut down cycle due to migraine with aura, Lary does not prefer depo and likely taking a POP would not give her more satisfactory results for anovulation  -discussed endometriosis happens when endometrial cells travel outside the uterus and attach at remote sites within the body and can cause pain,scarring, infertility, goal is to shut down cycles to prevent pain and scarring  -discussed gold standard of diagnosis is diagnostic laparoscopy, can pursue excision surgery  -does not prefer to try POP at  this time  -interested in meeting with nutritionist as has definite food triggers, sugar, salt and would like a referral  -discussed more hollistic inflammation reduction with diet, nutrition, stress reduction, exercise, taking NSAID  -open to pelvic PT  -US overall normal simple cyst on right ovary, discussed physiologic finding, paraovarian cyst presence known  -given name of Dr. Cesar Tanner endometriosis specialist per pt request  -can book surgical consult with MD [facility]BG[facility]NER regarding endometriosis at anytime  -discussed lupron, orlissa and myfembree as medications that can help for endometriosis pain, can have side effects such as hot flashes, night sweats, mood swings with medications does not feel like these align with her preferences at this time.    Rtc prn or anually with needs

## 2023-11-20 ENCOUNTER — TELEPHONE (OUTPATIENT)
Dept: OBGYN | Facility: CLINIC | Age: 23
End: 2023-11-20
Payer: COMMERCIAL

## 2023-11-20 NOTE — TELEPHONE ENCOUNTER
----- Message from Snady Aggarwal MD sent at 11/18/2023  1:23 PM CST -----  Regarding: ultrasound follow up phone visit  Would you be able to call this patient and set her up for a phone visit to review her pelvic ultrasound results and her current symptoms.  I had instructed her to do that, but I see she didn't.  If she does not wish to have a visit, then that's ok too.  She was sent her results via [employer].  The phone visit would just be to discuss details of the images and answer any questions she has.  Thanks,  Sandy

## 2023-12-28 ENCOUNTER — THERAPY VISIT (OUTPATIENT)
Dept: PHYSICAL THERAPY | Facility: CLINIC | Age: 23
End: 2023-12-28
Payer: COMMERCIAL

## 2023-12-28 DIAGNOSIS — R10.2 PELVIC PAIN IN FEMALE: ICD-10-CM

## 2023-12-28 DIAGNOSIS — M62.838 MUSCLE SPASM: Primary | ICD-10-CM

## 2023-12-28 PROCEDURE — 97535 SELF CARE MNGMENT TRAINING: CPT | Mod: GP

## 2023-12-28 PROCEDURE — 97161 PT EVAL LOW COMPLEX 20 MIN: CPT | Mod: GP

## 2023-12-28 PROCEDURE — 97530 THERAPEUTIC ACTIVITIES: CPT | Mod: GP

## 2023-12-28 PROCEDURE — 97140 MANUAL THERAPY 1/> REGIONS: CPT | Mod: GP

## 2023-12-28 NOTE — PROGRESS NOTES
PHYSICAL THERAPY EVALUATION  Type of Visit: Evaluation    See electronic medical record for Abuse and Falls Screening details.    Subjective       Presenting condition or subjective complaint:   abdominal pain getting worse since April. Would have pain once a month sharp stabbing pain with cramping that lasted a few hours. Happening more often and more intense. Stomach feels sore. Pain in line with ovulation   Date of onset: 04/01/23    Relevant medical history:   likely endometriosis - going [place] see [place] [facility] [place]  Dates & types of surgery:   no    Prior diagnostic imaging/testing results:       Prior therapy history for the same diagnosis, illness or injury:        Prior Level of Function  Transfers: Independent  Ambulation: Independent  ADL: Independent  IADL: independent       Employment:     finance - desk   Hobbies/Interests:   hang out with friends, exercise - walking, light lifting    Patient goals for therapy:   make some progress on my pain    Pain assessment:  none at the moment      Objective      PELVIC EVALUATION  ADDITIONAL HISTORY:  Sex assigned at birth:   female  Gender identity:      Pronouns:        Bladder History:  Feels bladder filling:    Triggers for feeling of inability to wait to go to the bathroom:    urgency at night before bed  How long can you wait to urinate:    Gets up at night to urinate:    1x/night   Can stop the flow of urine when urinating:    Volume of urine usually released:     Other issues:   thinks she is emptying bladder all the way.   Number of bladder infections in last 12 months:  no  Fluid intake per day:      5-6 glasses water, 1 cup coffee and tea   Medications taken for bladder:       Activities causing urine leak:    no  Amount of urine typically leaked:    Pads used to help with leaking:          Bowel History:  Frequency of bowel movement:  daily but sometimes less   Consistency of stool:    type 3 on bristol stool chart  Ignores the urge to defecate:   sometimes if bathroom not available   Other bowel issues:  constipation, straining, occasional fissure with constipation BM, incomplete emptying  Length of time spent trying to have a bowel movement:   right away or up to 10 minutes   Fiber - fiber gummies   Magnesium   Staying away from really salty and sugary foods     Sexual Function History:  Sexual orientation:    bisexual  Sexually active:  yes   Lubrication used:     yes   Pelvic pain:    sometimes pain with intercourse, vaginal with initial or deep penetration   Pain or difficulty with orgasms/erection/ejaculation:      State of menopause:   perimenopausal   Hormone medications:     no - IUD       No previous pregnancies or deliveries     Discussed reason for referral regarding pelvic health needs and external/internal pelvic floor muscle examination with patient/guardian.  Opportunity provided to ask questions and verbal consent for assessment and intervention was given.    PAIN: Pain Level at Rest: 0/10  Pain Level with Use: 8/10  Pain Location: abdominal, pelvic and occasionally hip and back  Pain Quality: Sharp, Stabbing, and cramping  Pain Frequency: intermittent  Pain is Worst: variable  Pain is Exacerbated By: certain foods  Pain is Relieved By: heat and Alieve  Pain Progression: Worsened  POSTURE: Sitting Posture: Rounded shoulders  LUMBAR SCREEN: AROM WNL, some abdominal pulling with SB and side glide  HIP SCREEN:  Strength:  global 4-5/5    Functional Strength Testing: Double Leg Squat: Good technique/no significant findings  SLS: no hip drop     PELVIC/SI SCREEN:  WNL     PELVIC EXAM  External Visual Inspection:  At rest: Normal  With voluntary pelvic floor contraction: Perineal elevation  With intra-abdominal pressure: Cough: No change  Bearing down as defecation: Perineal descent    Integumentary:   Introitus: Unremarkable    External Digital Palpation per Perineum:   Unremarkable      Internal Digital Palpation:  Per  Vagina:  Tenderness  Digital Muscle Performance: P (Power): 4/5  E (Endurance): 10 sec  Relaxation Post-Contraction: Normal      ABDOMINAL ASSESSMENT  TA activation: initially pushes out but then with cueing is able to activate and pull in, does well with pelvic floor contraction    Fascial Tension/Restriction/Tone: Hypomobile, discomfort    Assessment & Plan   CLINICAL IMPRESSIONS  Medical Diagnosis: pelvic pain    Treatment Diagnosis: abdominal and pelvic pain   Impression/Assessment: Patient is a 23 year old female with abdominal and pelvic pain complaints.  The following significant findings have been identified: Pain, Impaired muscle performance, and Decreased activity tolerance. These impairments interfere with their ability to perform self care tasks, work tasks, and recreational activities as compared to previous level of function.     Clinical Decision Making (Complexity):  Clinical Presentation: Stable/Uncomplicated  Clinical Presentation Rationale: based on medical and personal factors listed in PT evaluation  Clinical Decision Making (Complexity): Low complexity    PLAN OF CARE  Treatment Interventions:  Modalities: Biofeedback, Dry Needling, E-stim, Ultrasound  Interventions: Manual Therapy, Neuromuscular Re-education, Therapeutic Activity, Therapeutic Exercise, Self-Care/Home Management    Long Term Goals     PT Goal 1  Goal Identifier: abdominal pain  Goal Description: pt will reduce abdominal pain to <2/10 or frequency of <2x/month  Rationale: to maximize safety and independence with performance of ADLs and functional tasks  Target Date: 03/21/24  PT Goal 2  Goal Identifier: pelvic pain  Goal Description: patient will reduce pelvic pain in sitting or standing and with penetration to <2/10 and/or frequency of less than 1x/month  Rationale: to maximize safety and independence with performance of ADLs and functional tasks  Target Date: 03/21/24      Frequency of Treatment: 1x/week for 3 weeks then 1x  every 3 weeks for 9 weeks  Duration of Treatment: 12 weeks    Recommended Referrals to Other Professionals: Physical Therapy  Education Assessment:   Learner/Method: Patient    Risks and benefits of evaluation/treatment have been explained.   Patient/Family/caregiver agrees with Plan of Care.     Evaluation Time:     PT Eval, Low Complexity Minutes (37598): 25      Signing Clinician: Carmina Morataya PT

## 2024-01-04 ENCOUNTER — THERAPY VISIT (OUTPATIENT)
Dept: PHYSICAL THERAPY | Facility: CLINIC | Age: 24
End: 2024-01-04
Payer: COMMERCIAL

## 2024-01-04 DIAGNOSIS — R10.2 PELVIC PAIN IN FEMALE: Primary | ICD-10-CM

## 2024-01-04 DIAGNOSIS — M62.838 MUSCLE SPASM: ICD-10-CM

## 2024-01-04 PROCEDURE — 97112 NEUROMUSCULAR REEDUCATION: CPT | Mod: GP

## 2024-01-04 PROCEDURE — 97530 THERAPEUTIC ACTIVITIES: CPT | Mod: GP

## 2024-01-04 PROCEDURE — 97140 MANUAL THERAPY 1/> REGIONS: CPT | Mod: GP

## 2024-01-11 ENCOUNTER — THERAPY VISIT (OUTPATIENT)
Dept: PHYSICAL THERAPY | Facility: CLINIC | Age: 24
End: 2024-01-11
Payer: COMMERCIAL

## 2024-01-11 DIAGNOSIS — M62.838 MUSCLE SPASM: ICD-10-CM

## 2024-01-11 DIAGNOSIS — R10.2 PELVIC PAIN IN FEMALE: Primary | ICD-10-CM

## 2024-01-11 PROCEDURE — 97530 THERAPEUTIC ACTIVITIES: CPT | Mod: GP

## 2024-01-11 PROCEDURE — 97112 NEUROMUSCULAR REEDUCATION: CPT | Mod: GP

## 2024-01-11 PROCEDURE — 97140 MANUAL THERAPY 1/> REGIONS: CPT | Mod: GP

## 2024-01-13 ENCOUNTER — E-VISIT (OUTPATIENT)
Dept: FAMILY MEDICINE | Facility: CLINIC | Age: 24
End: 2024-01-13
Payer: COMMERCIAL

## 2024-01-13 DIAGNOSIS — R51.9 ACUTE NONINTRACTABLE HEADACHE, UNSPECIFIED HEADACHE TYPE: Primary | ICD-10-CM

## 2024-01-13 PROCEDURE — 99207 PR NO BILLABLE SERVICE THIS VISIT: CPT | Performed by: PHYSICIAN ASSISTANT

## 2024-01-29 ENCOUNTER — THERAPY VISIT (OUTPATIENT)
Dept: PHYSICAL THERAPY | Facility: CLINIC | Age: 24
End: 2024-01-29
Payer: COMMERCIAL

## 2024-01-29 DIAGNOSIS — R10.2 PELVIC PAIN IN FEMALE: Primary | ICD-10-CM

## 2024-01-29 DIAGNOSIS — M62.838 MUSCLE SPASM: ICD-10-CM

## 2024-01-29 PROCEDURE — 97530 THERAPEUTIC ACTIVITIES: CPT | Mod: GP

## 2024-01-29 PROCEDURE — 97112 NEUROMUSCULAR REEDUCATION: CPT | Mod: GP

## 2024-01-29 PROCEDURE — 97140 MANUAL THERAPY 1/> REGIONS: CPT | Mod: GP

## 2024-02-23 ENCOUNTER — THERAPY VISIT (OUTPATIENT)
Dept: PHYSICAL THERAPY | Facility: CLINIC | Age: 24
End: 2024-02-23
Payer: COMMERCIAL

## 2024-02-23 DIAGNOSIS — M62.838 MUSCLE SPASM: ICD-10-CM

## 2024-02-23 DIAGNOSIS — R10.2 PELVIC PAIN IN FEMALE: Primary | ICD-10-CM

## 2024-02-23 PROCEDURE — 97112 NEUROMUSCULAR REEDUCATION: CPT | Mod: GP

## 2024-02-23 PROCEDURE — 97530 THERAPEUTIC ACTIVITIES: CPT | Mod: GP

## 2024-04-29 PROBLEM — R10.2 PELVIC PAIN IN FEMALE: Status: RESOLVED | Noted: 2023-12-28 | Resolved: 2024-04-29

## 2024-04-29 PROBLEM — M62.838 MUSCLE SPASM: Status: RESOLVED | Noted: 2023-12-28 | Resolved: 2024-04-29

## 2024-04-29 NOTE — PROGRESS NOTES
02/23/24 0500   Appointment Info   Signing clinician's name / credentials Carmina Morataya, PT, DPT   Total/Authorized Visits E+T   Visits Used 5   Medical Diagnosis pelvic pain   PT Tx Diagnosis abdominal and pelvic pain   Progress Note/Certification   Onset of illness/injury or Date of Surgery 04/01/23   Therapy Frequency 1x/week for 3 weeks then 1x every 3 weeks for 9 weeks   Predicted Duration 12 weeks   Progress Note Due Date 03/21/24   Progress Note Completed Date 12/28/23   PT Goal 1   Goal Identifier abdominal pain   Goal Description pt will reduce abdominal pain to <2/10 or frequency of <2x/month   Rationale to maximize safety and independence with performance of ADLs and functional tasks   Goal Progress some cramping around period but subsided   Target Date 03/21/24   PT Goal 2   Goal Identifier pelvic pain   Goal Description patient will reduce pelvic pain in sitting or standing and with penetration to <2/10 and/or frequency of less than 1x/month   Rationale to maximize safety and independence with performance of ADLs and functional tasks   Goal Progress minimal pain, one instance of sharp pain   Target Date 03/21/24   Subjective Report   Subjective Report Pt reports last week she felt sick but didn't have cold symptoms. She was also have a lot of abdominal pain and gas pain. No consipation. she tried a heating pad, ibuprofen, and skin rolling. It helped minimize it but not resolve it. Thinks the abodminal pain has to do with her cycle. once a monthish gets what she thinks is ovulation pain but is less. Then also has gas pain.   Therapeutic Procedure/Exercise   PTRx Ther Proc 1 prone on elbows   PTRx Ther Proc 1 - Details x1 min, x2 mins, initially uncomfortable but then decreased discomfort the second time   Therapeutic Procedures: strength, endurance, ROM, flexibility minutes (33055) 5   Therapeutic Activity   Therapeutic Activities: dynamic activities to improve functional performance minutes  (06701) 15   Ther Act 1 ileocecal valve stimulation   Ther Act 1 - Details education on technique to help with gas pains   PTRx Ther Act 1 ILU Massage   PTRx Ther Act 1 - Details use when feeling gas pains   PTRx Ther Act 2 Abdominal Skin Rolling   PTRx Ther Act 2 - Details reviewed, continue especially on R side and midline   PTRx Ther Act 3 bowel routine   PTRx Ther Act 3 - Details use if bowels become irregular   Skilled Intervention cueing for dosing and technique   Patient Response/Progress pt demonstrated and verbalized understanding   Neuromuscular Re-education   Neuromuscular re-ed of mvmt, balance, coord, kinesthetic sense, posture, proprioception minutes (76783) 15   Neuromuscular Re-education Neuro Re-ed 2;Neuro Re-ed 3   Neuro Re-ed 1 belly breathing   Neuro Re-ed 1 - Details seated with hands on lower rib cage   Neuro Re-ed 2 seated diaphragm release   Neuro Re-ed 2 - Details working on diaphragm expansion into therapist fingers to decrease pain   Neuro Re-ed 3 bird dog   Neuro Re-ed 3 - Details reviewd, continue   Skilled Intervention breathing techniques to reduce pain   Patient Response/Progress pt demonstrated understanding   Education   Learner/Method Patient   Plan   Home program ptrx phone and printout   Plan for next session techniques to help prevent gas pains/cramping   Total Session Time   Timed Code Treatment Minutes 35   Total Treatment Time (sum of timed and untimed services) 35           DISCHARGE  Reason for Discharge: Patient has failed to schedule further appointments.  Pt improving on goals.    Equipment Issued: HEP    Discharge Plan: Patient to continue home program.    Referring Provider:  Era Bermudez

## 2024-06-19 ENCOUNTER — PATIENT OUTREACH (OUTPATIENT)
Dept: CARE COORDINATION | Facility: CLINIC | Age: 24
End: 2024-06-19
Payer: COMMERCIAL

## 2024-08-25 ENCOUNTER — HEALTH MAINTENANCE LETTER (OUTPATIENT)
Age: 24
End: 2024-08-25